# Patient Record
Sex: MALE | Race: WHITE | NOT HISPANIC OR LATINO | URBAN - METROPOLITAN AREA
[De-identification: names, ages, dates, MRNs, and addresses within clinical notes are randomized per-mention and may not be internally consistent; named-entity substitution may affect disease eponyms.]

---

## 2023-12-13 ENCOUNTER — APPOINTMENT (OUTPATIENT)
Dept: RADIOLOGY | Facility: HOSPITAL | Age: 43
DRG: 917 | End: 2023-12-13
Payer: COMMERCIAL

## 2023-12-13 ENCOUNTER — HOSPITAL ENCOUNTER (INPATIENT)
Facility: HOSPITAL | Age: 43
LOS: 3 days | Discharge: LEFT BEFORE DISCHARGE | DRG: 917 | End: 2023-12-17
Attending: EMERGENCY MEDICINE | Admitting: INTERNAL MEDICINE
Payer: COMMERCIAL

## 2023-12-13 DIAGNOSIS — J81.0 ACUTE PULMONARY EDEMA (MULTI): Primary | ICD-10-CM

## 2023-12-13 DIAGNOSIS — F19.10 SUBSTANCE ABUSE (MULTI): ICD-10-CM

## 2023-12-13 DIAGNOSIS — J96.01 ACUTE RESPIRATORY FAILURE WITH HYPOXIA (MULTI): ICD-10-CM

## 2023-12-13 LAB
ALBUMIN SERPL-MCNC: 4.2 G/DL (ref 3.5–5)
ALP BLD-CCNC: 137 U/L (ref 35–125)
ALT SERPL-CCNC: 23 U/L (ref 5–40)
AMPHETAMINES UR QL SCN>1000 NG/ML: NEGATIVE
ANION GAP SERPL CALC-SCNC: 14 MMOL/L
APPARATUS: ABNORMAL
APPEARANCE UR: CLEAR
ARTERIAL PATENCY WRIST A: POSITIVE
AST SERPL-CCNC: 41 U/L (ref 5–40)
BARBITURATES UR QL SCN>300 NG/ML: NEGATIVE
BASE EXCESS BLDA CALC-SCNC: 5.5 MMOL/L (ref -2–3)
BENZODIAZ UR QL SCN>300 NG/ML: POSITIVE
BILIRUB SERPL-MCNC: 0.3 MG/DL (ref 0.1–1.2)
BILIRUB UR STRIP.AUTO-MCNC: NEGATIVE MG/DL
BODY TEMPERATURE: 37 DEGREES CELSIUS
BUN SERPL-MCNC: 18 MG/DL (ref 8–25)
BZE UR QL SCN>300 NG/ML: NEGATIVE
CALCIUM SERPL-MCNC: 9.8 MG/DL (ref 8.5–10.4)
CANNABINOIDS UR QL SCN>50 NG/ML: NEGATIVE
CAOX CRY #/AREA UR COMP ASSIST: ABNORMAL /HPF
CHLORIDE SERPL-SCNC: 98 MMOL/L (ref 97–107)
CO2 SERPL-SCNC: 26 MMOL/L (ref 24–31)
COLOR UR: YELLOW
CREAT SERPL-MCNC: 0.7 MG/DL (ref 0.4–1.6)
ERYTHROCYTE [DISTWIDTH] IN BLOOD BY AUTOMATED COUNT: 12.5 % (ref 11.5–14.5)
FENTANYL+NORFENTANYL UR QL SCN: POSITIVE
GFR SERPL CREATININE-BSD FRML MDRD: >90 ML/MIN/1.73M*2
GLUCOSE SERPL-MCNC: 131 MG/DL (ref 65–99)
GLUCOSE UR STRIP.AUTO-MCNC: NORMAL MG/DL
HCO3 BLDA-SCNC: 31.1 MMOL/L (ref 22–26)
HCT VFR BLD AUTO: 39.3 % (ref 41–52)
HGB BLD-MCNC: 12.3 G/DL (ref 13.5–17.5)
INHALED O2 CONCENTRATION: 100 %
KETONES UR STRIP.AUTO-MCNC: NEGATIVE MG/DL
LACTATE BLDV-SCNC: 1.1 MMOL/L (ref 0.4–2)
LEUKOCYTE ESTERASE UR QL STRIP.AUTO: NEGATIVE
MCH RBC QN AUTO: 28.9 PG (ref 26–34)
MCHC RBC AUTO-ENTMCNC: 31.3 G/DL (ref 32–36)
MCV RBC AUTO: 93 FL (ref 80–100)
METHADONE UR QL SCN>300 NG/ML: POSITIVE
MUCOUS THREADS #/AREA URNS AUTO: ABNORMAL /LPF
NITRITE UR QL STRIP.AUTO: NEGATIVE
NRBC BLD-RTO: 0 /100 WBCS (ref 0–0)
NT-PROBNP SERPL-MCNC: 239 PG/ML (ref 0–93)
OPIATES UR QL SCN>300 NG/ML: POSITIVE
OXYCODONE UR QL: NEGATIVE
OXYHGB MFR BLDA: 96.9 % (ref 94–98)
PCO2 BLDA: 48 MM HG (ref 38–42)
PCP UR QL SCN>25 NG/ML: NEGATIVE
PH BLDA: 7.42 PH (ref 7.38–7.42)
PH UR STRIP.AUTO: 5.5 [PH]
PLATELET # BLD AUTO: 298 X10*3/UL (ref 150–450)
PO2 BLDA: 155 MM HG (ref 85–95)
POTASSIUM SERPL-SCNC: 3.2 MMOL/L (ref 3.4–5.1)
PROT SERPL-MCNC: 7.7 G/DL (ref 5.9–7.9)
PROT UR STRIP.AUTO-MCNC: ABNORMAL MG/DL
RBC # BLD AUTO: 4.25 X10*6/UL (ref 4.5–5.9)
RBC # UR STRIP.AUTO: ABNORMAL /UL
RBC #/AREA URNS AUTO: >20 /HPF
SAO2 % BLDA: 99 % (ref 94–100)
SODIUM SERPL-SCNC: 138 MMOL/L (ref 133–145)
SP GR UR STRIP.AUTO: 1.03
SPECIMEN DRAWN FROM PATIENT: ABNORMAL
TROPONIN T SERPL-MCNC: 7 NG/L
TROPONIN T SERPL-MCNC: 7 NG/L
UROBILINOGEN UR STRIP.AUTO-MCNC: ABNORMAL MG/DL
WBC # BLD AUTO: 15 X10*3/UL (ref 4.4–11.3)
WBC #/AREA URNS AUTO: ABNORMAL /HPF

## 2023-12-13 PROCEDURE — 85027 COMPLETE CBC AUTOMATED: CPT | Performed by: NURSE PRACTITIONER

## 2023-12-13 PROCEDURE — 2500000005 HC RX 250 GENERAL PHARMACY W/O HCPCS: Performed by: EMERGENCY MEDICINE

## 2023-12-13 PROCEDURE — 87040 BLOOD CULTURE FOR BACTERIA: CPT | Mod: WESLAB | Performed by: NURSE PRACTITIONER

## 2023-12-13 PROCEDURE — 80307 DRUG TEST PRSMV CHEM ANLYZR: CPT | Performed by: NURSE PRACTITIONER

## 2023-12-13 PROCEDURE — 99285 EMERGENCY DEPT VISIT HI MDM: CPT | Performed by: EMERGENCY MEDICINE

## 2023-12-13 PROCEDURE — 36415 COLL VENOUS BLD VENIPUNCTURE: CPT | Performed by: NURSE PRACTITIONER

## 2023-12-13 PROCEDURE — 70450 CT HEAD/BRAIN W/O DYE: CPT

## 2023-12-13 PROCEDURE — 71045 X-RAY EXAM CHEST 1 VIEW: CPT | Mod: FY

## 2023-12-13 PROCEDURE — 94760 N-INVAS EAR/PLS OXIMETRY 1: CPT

## 2023-12-13 PROCEDURE — 36600 WITHDRAWAL OF ARTERIAL BLOOD: CPT

## 2023-12-13 PROCEDURE — 5A0935A ASSISTANCE WITH RESPIRATORY VENTILATION, LESS THAN 24 CONSECUTIVE HOURS, HIGH NASAL FLOW/VELOCITY: ICD-10-PCS | Performed by: NURSE PRACTITIONER

## 2023-12-13 PROCEDURE — 81001 URINALYSIS AUTO W/SCOPE: CPT | Performed by: NURSE PRACTITIONER

## 2023-12-13 PROCEDURE — 84075 ASSAY ALKALINE PHOSPHATASE: CPT | Performed by: NURSE PRACTITIONER

## 2023-12-13 PROCEDURE — 94660 CPAP INITIATION&MGMT: CPT

## 2023-12-13 PROCEDURE — 93010 ELECTROCARDIOGRAM REPORT: CPT | Performed by: INTERNAL MEDICINE

## 2023-12-13 PROCEDURE — 82805 BLOOD GASES W/O2 SATURATION: CPT | Performed by: EMERGENCY MEDICINE

## 2023-12-13 PROCEDURE — 84484 ASSAY OF TROPONIN QUANT: CPT | Performed by: NURSE PRACTITIONER

## 2023-12-13 PROCEDURE — 83605 ASSAY OF LACTIC ACID: CPT | Performed by: NURSE PRACTITIONER

## 2023-12-13 PROCEDURE — 2500000004 HC RX 250 GENERAL PHARMACY W/ HCPCS (ALT 636 FOR OP/ED): Performed by: NURSE PRACTITIONER

## 2023-12-13 PROCEDURE — 83880 ASSAY OF NATRIURETIC PEPTIDE: CPT | Performed by: NURSE PRACTITIONER

## 2023-12-13 RX ORDER — ONDANSETRON HYDROCHLORIDE 2 MG/ML
4 INJECTION, SOLUTION INTRAVENOUS ONCE
Status: COMPLETED | OUTPATIENT
Start: 2023-12-13 | End: 2023-12-13

## 2023-12-13 RX ORDER — NALOXONE HYDROCHLORIDE 1 MG/ML
2 INJECTION INTRAMUSCULAR; INTRAVENOUS; SUBCUTANEOUS ONCE
Status: COMPLETED | OUTPATIENT
Start: 2023-12-13 | End: 2023-12-13

## 2023-12-13 RX ADMIN — NALOXONE HYDROCHLORIDE 2 MG: 1 INJECTION PARENTERAL at 20:11

## 2023-12-13 RX ADMIN — Medication: at 20:54

## 2023-12-13 RX ADMIN — ONDANSETRON 4 MG: 2 INJECTION INTRAMUSCULAR; INTRAVENOUS at 20:09

## 2023-12-13 RX ADMIN — Medication: at 23:33

## 2023-12-13 RX ADMIN — PIPERACILLIN SODIUM AND TAZOBACTAM SODIUM 4.5 G: 4; .5 INJECTION, SOLUTION INTRAVENOUS at 20:05

## 2023-12-13 ASSESSMENT — PAIN - FUNCTIONAL ASSESSMENT: PAIN_FUNCTIONAL_ASSESSMENT: UNABLE TO SELF-REPORT

## 2023-12-14 ENCOUNTER — APPOINTMENT (OUTPATIENT)
Dept: RADIOLOGY | Facility: HOSPITAL | Age: 43
DRG: 917 | End: 2023-12-14
Payer: COMMERCIAL

## 2023-12-14 PROBLEM — J81.0 ACUTE PULMONARY EDEMA (MULTI): Status: ACTIVE | Noted: 2023-12-14

## 2023-12-14 LAB
ALBUMIN SERPL-MCNC: 3.7 G/DL (ref 3.5–5)
ANION GAP SERPL CALC-SCNC: 10 MMOL/L
BASOPHILS # BLD AUTO: 0.03 X10*3/UL (ref 0–0.1)
BASOPHILS NFR BLD AUTO: 0.3 %
BUN SERPL-MCNC: 14 MG/DL (ref 8–25)
CALCIUM SERPL-MCNC: 9 MG/DL (ref 8.5–10.4)
CHLORIDE SERPL-SCNC: 99 MMOL/L (ref 97–107)
CO2 SERPL-SCNC: 30 MMOL/L (ref 24–31)
CREAT SERPL-MCNC: 0.7 MG/DL (ref 0.4–1.6)
EOSINOPHIL # BLD AUTO: 0.1 X10*3/UL (ref 0–0.7)
EOSINOPHIL NFR BLD AUTO: 0.8 %
ERYTHROCYTE [DISTWIDTH] IN BLOOD BY AUTOMATED COUNT: 12.3 % (ref 11.5–14.5)
GFR SERPL CREATININE-BSD FRML MDRD: >90 ML/MIN/1.73M*2
GLUCOSE SERPL-MCNC: 109 MG/DL (ref 65–99)
HCT VFR BLD AUTO: 37 % (ref 41–52)
HGB BLD-MCNC: 11.7 G/DL (ref 13.5–17.5)
IMM GRANULOCYTES # BLD AUTO: 0.04 X10*3/UL (ref 0–0.7)
IMM GRANULOCYTES NFR BLD AUTO: 0.3 % (ref 0–0.9)
LYMPHOCYTES # BLD AUTO: 2.2 X10*3/UL (ref 1.2–4.8)
LYMPHOCYTES NFR BLD AUTO: 18.5 %
MAGNESIUM SERPL-MCNC: 1.8 MG/DL (ref 1.6–3.1)
MCH RBC QN AUTO: 29.3 PG (ref 26–34)
MCHC RBC AUTO-ENTMCNC: 31.6 G/DL (ref 32–36)
MCV RBC AUTO: 93 FL (ref 80–100)
MONOCYTES # BLD AUTO: 0.61 X10*3/UL (ref 0.1–1)
MONOCYTES NFR BLD AUTO: 5.1 %
NEUTROPHILS # BLD AUTO: 8.88 X10*3/UL (ref 1.2–7.7)
NEUTROPHILS NFR BLD AUTO: 75 %
NRBC BLD-RTO: 0 /100 WBCS (ref 0–0)
PHOSPHATE SERPL-MCNC: 4 MG/DL (ref 2.5–4.5)
PLATELET # BLD AUTO: 273 X10*3/UL (ref 150–450)
POTASSIUM SERPL-SCNC: 3.7 MMOL/L (ref 3.4–5.1)
RBC # BLD AUTO: 4 X10*6/UL (ref 4.5–5.9)
SARS-COV-2 RNA RESP QL NAA+PROBE: NOT DETECTED
SODIUM SERPL-SCNC: 139 MMOL/L (ref 133–145)
TROPONIN T SERPL-MCNC: 8 NG/L
TROPONIN T SERPL-MCNC: 8 NG/L
WBC # BLD AUTO: 11.9 X10*3/UL (ref 4.4–11.3)

## 2023-12-14 PROCEDURE — 2500000001 HC RX 250 WO HCPCS SELF ADMINISTERED DRUGS (ALT 637 FOR MEDICARE OP): Performed by: INTERNAL MEDICINE

## 2023-12-14 PROCEDURE — 2020000001 HC ICU ROOM DAILY

## 2023-12-14 PROCEDURE — 85025 COMPLETE CBC W/AUTO DIFF WBC: CPT | Performed by: INTERNAL MEDICINE

## 2023-12-14 PROCEDURE — C9113 INJ PANTOPRAZOLE SODIUM, VIA: HCPCS | Performed by: INTERNAL MEDICINE

## 2023-12-14 PROCEDURE — 83735 ASSAY OF MAGNESIUM: CPT | Performed by: INTERNAL MEDICINE

## 2023-12-14 PROCEDURE — 2500000005 HC RX 250 GENERAL PHARMACY W/O HCPCS: Performed by: EMERGENCY MEDICINE

## 2023-12-14 PROCEDURE — 94760 N-INVAS EAR/PLS OXIMETRY 1: CPT

## 2023-12-14 PROCEDURE — 87635 SARS-COV-2 COVID-19 AMP PRB: CPT | Performed by: EMERGENCY MEDICINE

## 2023-12-14 PROCEDURE — 96372 THER/PROPH/DIAG INJ SC/IM: CPT | Performed by: INTERNAL MEDICINE

## 2023-12-14 PROCEDURE — 2500000002 HC RX 250 W HCPCS SELF ADMINISTERED DRUGS (ALT 637 FOR MEDICARE OP, ALT 636 FOR OP/ED): Performed by: INTERNAL MEDICINE

## 2023-12-14 PROCEDURE — 2500000004 HC RX 250 GENERAL PHARMACY W/ HCPCS (ALT 636 FOR OP/ED): Performed by: INTERNAL MEDICINE

## 2023-12-14 PROCEDURE — 71045 X-RAY EXAM CHEST 1 VIEW: CPT

## 2023-12-14 PROCEDURE — 99223 1ST HOSP IP/OBS HIGH 75: CPT

## 2023-12-14 PROCEDURE — 2500000001 HC RX 250 WO HCPCS SELF ADMINISTERED DRUGS (ALT 637 FOR MEDICARE OP): Performed by: HOSPITALIST

## 2023-12-14 PROCEDURE — 84484 ASSAY OF TROPONIN QUANT: CPT | Performed by: INTERNAL MEDICINE

## 2023-12-14 PROCEDURE — 36415 COLL VENOUS BLD VENIPUNCTURE: CPT | Performed by: NURSE PRACTITIONER

## 2023-12-14 PROCEDURE — 2500000004 HC RX 250 GENERAL PHARMACY W/ HCPCS (ALT 636 FOR OP/ED)

## 2023-12-14 PROCEDURE — 84484 ASSAY OF TROPONIN QUANT: CPT | Performed by: NURSE PRACTITIONER

## 2023-12-14 PROCEDURE — 80069 RENAL FUNCTION PANEL: CPT | Performed by: INTERNAL MEDICINE

## 2023-12-14 PROCEDURE — 94660 CPAP INITIATION&MGMT: CPT

## 2023-12-14 PROCEDURE — 36415 COLL VENOUS BLD VENIPUNCTURE: CPT | Performed by: INTERNAL MEDICINE

## 2023-12-14 PROCEDURE — 2500000005 HC RX 250 GENERAL PHARMACY W/O HCPCS: Performed by: INTERNAL MEDICINE

## 2023-12-14 RX ORDER — IBUPROFEN 600 MG/1
600 TABLET ORAL EVERY 6 HOURS PRN
Status: DISCONTINUED | OUTPATIENT
Start: 2023-12-14 | End: 2023-12-17 | Stop reason: HOSPADM

## 2023-12-14 RX ORDER — TRAZODONE HYDROCHLORIDE 50 MG/1
25 TABLET ORAL NIGHTLY PRN
Status: DISCONTINUED | OUTPATIENT
Start: 2023-12-14 | End: 2023-12-17 | Stop reason: HOSPADM

## 2023-12-14 RX ORDER — ACETAMINOPHEN 325 MG/1
650 TABLET ORAL EVERY 4 HOURS PRN
Status: DISCONTINUED | OUTPATIENT
Start: 2023-12-14 | End: 2023-12-17 | Stop reason: HOSPADM

## 2023-12-14 RX ORDER — DULOXETIN HYDROCHLORIDE 20 MG/1
20 CAPSULE, DELAYED RELEASE ORAL DAILY
COMMUNITY

## 2023-12-14 RX ORDER — ONDANSETRON HYDROCHLORIDE 2 MG/ML
4 INJECTION, SOLUTION INTRAVENOUS EVERY 8 HOURS PRN
Status: DISCONTINUED | OUTPATIENT
Start: 2023-12-14 | End: 2023-12-17 | Stop reason: HOSPADM

## 2023-12-14 RX ORDER — ACETAMINOPHEN 160 MG/5ML
650 SOLUTION ORAL EVERY 4 HOURS PRN
Status: DISCONTINUED | OUTPATIENT
Start: 2023-12-14 | End: 2023-12-17 | Stop reason: HOSPADM

## 2023-12-14 RX ORDER — ONDANSETRON 4 MG/1
4 TABLET, FILM COATED ORAL EVERY 8 HOURS PRN
Status: DISCONTINUED | OUTPATIENT
Start: 2023-12-14 | End: 2023-12-17 | Stop reason: HOSPADM

## 2023-12-14 RX ORDER — IBUPROFEN 200 MG
1 TABLET ORAL EVERY 24 HOURS
COMMUNITY

## 2023-12-14 RX ORDER — BUPRENORPHINE 2 MG/1
2 TABLET SUBLINGUAL EVERY 4 HOURS PRN
Status: DISCONTINUED | OUTPATIENT
Start: 2023-12-14 | End: 2023-12-17 | Stop reason: HOSPADM

## 2023-12-14 RX ORDER — ZOLPIDEM TARTRATE 12.5 MG/1
12.5 TABLET, FILM COATED, EXTENDED RELEASE ORAL NIGHTLY PRN
COMMUNITY

## 2023-12-14 RX ORDER — KETOROLAC TROMETHAMINE 30 MG/ML
30 INJECTION, SOLUTION INTRAMUSCULAR; INTRAVENOUS EVERY 6 HOURS PRN
Status: DISCONTINUED | OUTPATIENT
Start: 2023-12-14 | End: 2023-12-14 | Stop reason: ALTCHOICE

## 2023-12-14 RX ORDER — LIDOCAINE 560 MG/1
1 PATCH PERCUTANEOUS; TOPICAL; TRANSDERMAL DAILY
Status: DISCONTINUED | OUTPATIENT
Start: 2023-12-14 | End: 2023-12-17 | Stop reason: HOSPADM

## 2023-12-14 RX ORDER — DOXYCYCLINE 100 MG/1
100 TABLET ORAL 2 TIMES DAILY
COMMUNITY

## 2023-12-14 RX ORDER — PANTOPRAZOLE SODIUM 40 MG/1
40 TABLET, DELAYED RELEASE ORAL 2 TIMES DAILY
COMMUNITY

## 2023-12-14 RX ORDER — POTASSIUM CHLORIDE 1.5 G/1.58G
40 POWDER, FOR SOLUTION ORAL ONCE
Status: COMPLETED | OUTPATIENT
Start: 2023-12-14 | End: 2023-12-14

## 2023-12-14 RX ORDER — CLONIDINE HYDROCHLORIDE 0.1 MG/1
0.1 TABLET ORAL EVERY 6 HOURS PRN
Status: DISCONTINUED | OUTPATIENT
Start: 2023-12-14 | End: 2023-12-17 | Stop reason: HOSPADM

## 2023-12-14 RX ORDER — FUROSEMIDE 10 MG/ML
40 INJECTION INTRAMUSCULAR; INTRAVENOUS ONCE
Status: COMPLETED | OUTPATIENT
Start: 2023-12-14 | End: 2023-12-14

## 2023-12-14 RX ORDER — MEROPENEM 1 G/1
1 INJECTION, POWDER, FOR SOLUTION INTRAVENOUS EVERY 8 HOURS
Status: DISCONTINUED | OUTPATIENT
Start: 2023-12-14 | End: 2023-12-17 | Stop reason: HOSPADM

## 2023-12-14 RX ORDER — PANTOPRAZOLE SODIUM 40 MG/10ML
40 INJECTION, POWDER, LYOPHILIZED, FOR SOLUTION INTRAVENOUS DAILY
Status: DISCONTINUED | OUTPATIENT
Start: 2023-12-14 | End: 2023-12-15

## 2023-12-14 RX ORDER — SILDENAFIL 50 MG/1
50 TABLET, FILM COATED ORAL DAILY PRN
COMMUNITY

## 2023-12-14 RX ORDER — METHOCARBAMOL 500 MG/1
500 TABLET, FILM COATED ORAL EVERY 6 HOURS PRN
Status: DISCONTINUED | OUTPATIENT
Start: 2023-12-14 | End: 2023-12-17 | Stop reason: HOSPADM

## 2023-12-14 RX ORDER — LORAZEPAM 1 MG/1
1 TABLET ORAL EVERY 4 HOURS PRN
Status: DISCONTINUED | OUTPATIENT
Start: 2023-12-14 | End: 2023-12-17 | Stop reason: HOSPADM

## 2023-12-14 RX ORDER — ENOXAPARIN SODIUM 100 MG/ML
40 INJECTION SUBCUTANEOUS DAILY
Status: DISCONTINUED | OUTPATIENT
Start: 2023-12-14 | End: 2023-12-17 | Stop reason: HOSPADM

## 2023-12-14 RX ORDER — TRAMADOL HYDROCHLORIDE 50 MG/1
50 TABLET ORAL 4 TIMES DAILY
COMMUNITY

## 2023-12-14 RX ORDER — LANOLIN ALCOHOL/MO/W.PET/CERES
100 CREAM (GRAM) TOPICAL DAILY
Status: DISCONTINUED | OUTPATIENT
Start: 2023-12-14 | End: 2023-12-17 | Stop reason: HOSPADM

## 2023-12-14 RX ORDER — GABAPENTIN 400 MG/1
800 CAPSULE ORAL EVERY 8 HOURS SCHEDULED
Status: DISCONTINUED | OUTPATIENT
Start: 2023-12-14 | End: 2023-12-15

## 2023-12-14 RX ORDER — GABAPENTIN 800 MG/1
800 TABLET ORAL 3 TIMES DAILY
COMMUNITY

## 2023-12-14 RX ORDER — ACETAMINOPHEN 650 MG/1
650 SUPPOSITORY RECTAL EVERY 4 HOURS PRN
Status: DISCONTINUED | OUTPATIENT
Start: 2023-12-14 | End: 2023-12-17 | Stop reason: HOSPADM

## 2023-12-14 RX ORDER — POLYETHYLENE GLYCOL 3350 17 G/17G
17 POWDER, FOR SOLUTION ORAL DAILY PRN
Status: DISCONTINUED | OUTPATIENT
Start: 2023-12-14 | End: 2023-12-17 | Stop reason: HOSPADM

## 2023-12-14 RX ORDER — DICYCLOMINE HYDROCHLORIDE 10 MG/1
10 CAPSULE ORAL EVERY 6 HOURS PRN
Status: DISCONTINUED | OUTPATIENT
Start: 2023-12-14 | End: 2023-12-17 | Stop reason: HOSPADM

## 2023-12-14 RX ORDER — HYDROXYZINE PAMOATE 50 MG/1
50 CAPSULE ORAL EVERY 6 HOURS PRN
Status: DISCONTINUED | OUTPATIENT
Start: 2023-12-14 | End: 2023-12-17 | Stop reason: HOSPADM

## 2023-12-14 RX ADMIN — Medication 1 G: at 03:45

## 2023-12-14 RX ADMIN — BUSPIRONE HYDROCHLORIDE 15 MG: 10 TABLET ORAL at 20:12

## 2023-12-14 RX ADMIN — POTASSIUM CHLORIDE 40 MEQ: 1.5 FOR SOLUTION ORAL at 05:37

## 2023-12-14 RX ADMIN — BUPRENORPHINE 2 MG: 2 TABLET SUBLINGUAL at 23:21

## 2023-12-14 RX ADMIN — FUROSEMIDE 40 MG: 10 INJECTION, SOLUTION INTRAMUSCULAR; INTRAVENOUS at 03:45

## 2023-12-14 RX ADMIN — KETOROLAC TROMETHAMINE 30 MG: 30 INJECTION, SOLUTION INTRAMUSCULAR at 05:42

## 2023-12-14 RX ADMIN — LIDOCAINE 1 PATCH: 4 PATCH TOPICAL at 03:45

## 2023-12-14 RX ADMIN — HYDROXYZINE PAMOATE 50 MG: 50 CAPSULE ORAL at 23:12

## 2023-12-14 RX ADMIN — LORAZEPAM 1 MG: 1 TABLET ORAL at 13:27

## 2023-12-14 RX ADMIN — METHYLPREDNISOLONE SODIUM SUCCINATE 40 MG: 40 INJECTION, POWDER, FOR SOLUTION INTRAMUSCULAR; INTRAVENOUS at 03:44

## 2023-12-14 RX ADMIN — BUPRENORPHINE 2 MG: 2 TABLET SUBLINGUAL at 13:50

## 2023-12-14 RX ADMIN — Medication 1 G: at 19:06

## 2023-12-14 RX ADMIN — GABAPENTIN 800 MG: 400 CAPSULE ORAL at 21:15

## 2023-12-14 RX ADMIN — Medication 1 G: at 11:02

## 2023-12-14 RX ADMIN — PANTOPRAZOLE SODIUM 40 MG: 40 INJECTION, POWDER, FOR SOLUTION INTRAVENOUS at 08:57

## 2023-12-14 RX ADMIN — Medication: at 03:58

## 2023-12-14 RX ADMIN — Medication 40 L/MIN: at 07:13

## 2023-12-14 SDOH — HEALTH STABILITY: PHYSICAL HEALTH
ON AVERAGE, HOW MANY DAYS PER WEEK DO YOU ENGAGE IN MODERATE TO STRENUOUS EXERCISE (LIKE A BRISK WALK)?: PATIENT DECLINED

## 2023-12-14 SDOH — SOCIAL STABILITY: SOCIAL INSECURITY: HAS ANYONE EVER THREATENED TO HURT YOUR FAMILY OR YOUR PETS?: NO

## 2023-12-14 SDOH — SOCIAL STABILITY: SOCIAL INSECURITY: HAVE YOU HAD THOUGHTS OF HARMING ANYONE ELSE?: NO

## 2023-12-14 SDOH — SOCIAL STABILITY: SOCIAL INSECURITY: DO YOU FEEL ANYONE HAS EXPLOITED OR TAKEN ADVANTAGE OF YOU FINANCIALLY OR OF YOUR PERSONAL PROPERTY?: NO

## 2023-12-14 SDOH — SOCIAL STABILITY: SOCIAL INSECURITY: ABUSE: ADULT

## 2023-12-14 SDOH — SOCIAL STABILITY: SOCIAL INSECURITY: ARE YOU OR HAVE YOU BEEN THREATENED OR ABUSED PHYSICALLY, EMOTIONALLY, OR SEXUALLY BY ANYONE?: NO

## 2023-12-14 SDOH — HEALTH STABILITY: PHYSICAL HEALTH: ON AVERAGE, HOW MANY MINUTES DO YOU ENGAGE IN EXERCISE AT THIS LEVEL?: PATIENT DECLINED

## 2023-12-14 SDOH — SOCIAL STABILITY: SOCIAL INSECURITY: ARE THERE ANY APPARENT SIGNS OF INJURIES/BEHAVIORS THAT COULD BE RELATED TO ABUSE/NEGLECT?: NO

## 2023-12-14 SDOH — SOCIAL STABILITY: SOCIAL INSECURITY: WERE YOU ABLE TO COMPLETE ALL THE BEHAVIORAL HEALTH SCREENINGS?: YES

## 2023-12-14 SDOH — SOCIAL STABILITY: SOCIAL INSECURITY: DO YOU FEEL UNSAFE GOING BACK TO THE PLACE WHERE YOU ARE LIVING?: NO

## 2023-12-14 SDOH — SOCIAL STABILITY: SOCIAL INSECURITY: DOES ANYONE TRY TO KEEP YOU FROM HAVING/CONTACTING OTHER FRIENDS OR DOING THINGS OUTSIDE YOUR HOME?: NO

## 2023-12-14 ASSESSMENT — ENCOUNTER SYMPTOMS
APPETITE CHANGE: 1
AGITATION: 0
DECREASED CONCENTRATION: 1
HYPERACTIVE: 1
HALLUCINATIONS: 1
NERVOUS/ANXIOUS: 1
FATIGUE: 1
UNEXPECTED WEIGHT CHANGE: 0
ACTIVITY CHANGE: 1
SHORTNESS OF BREATH: 1
SLEEP DISTURBANCE: 1
WEAKNESS: 1
DYSPHORIC MOOD: 1
CONFUSION: 1

## 2023-12-14 ASSESSMENT — COLUMBIA-SUICIDE SEVERITY RATING SCALE - C-SSRS
2. HAVE YOU ACTUALLY HAD ANY THOUGHTS OF KILLING YOURSELF?: NO
6. HAVE YOU EVER DONE ANYTHING, STARTED TO DO ANYTHING, OR PREPARED TO DO ANYTHING TO END YOUR LIFE?: NO
1. IN THE PAST MONTH, HAVE YOU WISHED YOU WERE DEAD OR WISHED YOU COULD GO TO SLEEP AND NOT WAKE UP?: NO

## 2023-12-14 ASSESSMENT — COGNITIVE AND FUNCTIONAL STATUS - GENERAL
MOVING TO AND FROM BED TO CHAIR: TOTAL
PATIENT BASELINE BEDBOUND: NO
MOVING FROM LYING ON BACK TO SITTING ON SIDE OF FLAT BED WITH BEDRAILS: TOTAL
MOBILITY SCORE: 6
TURNING FROM BACK TO SIDE WHILE IN FLAT BAD: TOTAL
EATING MEALS: TOTAL
HELP NEEDED FOR BATHING: TOTAL
CLIMB 3 TO 5 STEPS WITH RAILING: TOTAL
DRESSING REGULAR LOWER BODY CLOTHING: TOTAL
TOILETING: TOTAL
DAILY ACTIVITIY SCORE: 6
STANDING UP FROM CHAIR USING ARMS: TOTAL
PERSONAL GROOMING: TOTAL
WALKING IN HOSPITAL ROOM: TOTAL
DRESSING REGULAR UPPER BODY CLOTHING: TOTAL

## 2023-12-14 ASSESSMENT — PAIN SCALES - GENERAL
PAINLEVEL_OUTOF10: 9
PAINLEVEL_OUTOF10: 6
PAINLEVEL_OUTOF10: 6
PAINLEVEL_OUTOF10: 7
PAINLEVEL_OUTOF10: 0 - NO PAIN
PAINLEVEL_OUTOF10: 0 - NO PAIN

## 2023-12-14 ASSESSMENT — PAIN DESCRIPTION - LOCATION: LOCATION: BACK

## 2023-12-14 ASSESSMENT — PATIENT HEALTH QUESTIONNAIRE - PHQ9
1. LITTLE INTEREST OR PLEASURE IN DOING THINGS: NOT AT ALL
2. FEELING DOWN, DEPRESSED OR HOPELESS: NOT AT ALL
SUM OF ALL RESPONSES TO PHQ9 QUESTIONS 1 & 2: 0

## 2023-12-14 ASSESSMENT — ACTIVITIES OF DAILY LIVING (ADL)
HEARING - RIGHT EAR: FUNCTIONAL
DRESSING YOURSELF: INDEPENDENT
PATIENT'S MEMORY ADEQUATE TO SAFELY COMPLETE DAILY ACTIVITIES?: YES
BATHING: INDEPENDENT
TOILETING: INDEPENDENT
PATIENT'S MEMORY ADEQUATE TO SAFELY COMPLETE DAILY ACTIVITIES?: YES
ADEQUATE_TO_COMPLETE_ADL: YES
TOILETING: INDEPENDENT
WALKS IN HOME: INDEPENDENT
GROOMING: INDEPENDENT
HEARING - RIGHT EAR: FUNCTIONAL
BATHING: INDEPENDENT
FEEDING YOURSELF: INDEPENDENT
FEEDING YOURSELF: INDEPENDENT
LACK_OF_TRANSPORTATION: PATIENT DECLINED
JUDGMENT_ADEQUATE_SAFELY_COMPLETE_DAILY_ACTIVITIES: NO
ADEQUATE_TO_COMPLETE_ADL: YES
DRESSING YOURSELF: INDEPENDENT
JUDGMENT_ADEQUATE_SAFELY_COMPLETE_DAILY_ACTIVITIES: NO
WALKS IN HOME: INDEPENDENT
EFFECT OF PAIN ON DAILY ACTIVITIES: MOVEMENT
HEARING - LEFT EAR: FUNCTIONAL
GROOMING: INDEPENDENT
HEARING - LEFT EAR: FUNCTIONAL

## 2023-12-14 ASSESSMENT — PAIN DESCRIPTION - DESCRIPTORS
DESCRIPTORS: ACHING

## 2023-12-14 ASSESSMENT — PAIN - FUNCTIONAL ASSESSMENT
PAIN_FUNCTIONAL_ASSESSMENT: 0-10
PAIN_FUNCTIONAL_ASSESSMENT: FLACC (FACE, LEGS, ACTIVITY, CRY, CONSOLABILITY)
PAIN_FUNCTIONAL_ASSESSMENT: 0-10

## 2023-12-14 ASSESSMENT — LIFESTYLE VARIABLES
HOW OFTEN DO YOU HAVE A DRINK CONTAINING ALCOHOL: NEVER
TOTAL_SCORE: 10
AUDIT-C TOTAL SCORE: 0
AUDIT-C TOTAL SCORE: 0
SKIP TO QUESTIONS 9-10: 1
SUBSTANCE_ABUSE_PAST_12_MONTHS: YES
HOW OFTEN DO YOU HAVE 6 OR MORE DRINKS ON ONE OCCASION: NEVER
TOTAL_SCORE: 14
HOW MANY STANDARD DRINKS CONTAINING ALCOHOL DO YOU HAVE ON A TYPICAL DAY: PATIENT DOES NOT DRINK
PRESCIPTION_ABUSE_PAST_12_MONTHS: NO

## 2023-12-14 ASSESSMENT — PAIN DESCRIPTION - ORIENTATION: ORIENTATION: MID

## 2023-12-14 NOTE — CONSULTS
"Inpatient consult to Psychiatry  Consult performed by: DONALD Hunter-BAHMAN  Consult ordered by: Ginger Turner MD  Reason for consult: Multisubstance abuse, Competent to leave AMA?          History Of Present Illness  Abad Moore Jr. is a 43 y.o. male presenting with Acute pulmonary edema.    Before seeing the patient face-to-face, we discussed the case together with the nurse taking care of the patient. The nurse reports that she had to educate the patient multiple times and explain the situation as he was requesting to leave AMA. Additionally, the patient had been refusing medications despite the RN's education on the importance of receiving treatment. The nurse reports that the patient is agreeable to allowing her to start antibiotic IV.   The patient was lying in bed with a nasal cannula, receiving oxygen in a little bit of distress. When asked how he's doing, the patient states that he's \"doing okay\". We asked the patient if he was okay to sit and asked him a few questions, to which he responded, \"I don't care.\" The patient seems to be alert and oriented but has a little bit of difficulty answering questions, needing time to think about the date and location. He states that he is unable to recall any of the events that brought him here. We discussed with the patient that he has been constantly telling the nurse that he wants to leave AMA, and we explained to him the reasons for his admission and the consequences of leaving against medical advice. However, the patient states that he doesn't really care for the consequences stating that he can't take the annoyance of having to wear the nasal cannula to receive O2. He took the Nasal cannula off , but followed the directions of this provider and placed it back on. We continued to ask the patient if he still wants to leave after all that we had discussed, to which he states that he is still undecided \"I am still on the fence\". He mentions feeling " irritable due to being in the hospital and annoyed at having to wear a nasal cannula, and man catheter as he feels it limits his independence.   The patient is showing cognitive impairment and lack of ability ta make a decision and does not fully grasp what has occurred. He has difficulty maintaining proper eye contact and easily becomes irritable with the questions. However, he complies and attempts to answer most of them superficially with very short answers. He often states that he doesn't want to say more than what he's already said. The patient denies feeling depressed or having mood swings or feeling euphoric. He mentions feeling irritable not because of people but because he's in the hospital. The patient denies having any racing thoughts of any kind. He states that he's anxious because he doesn't understand what happened and what is next. The patient states that he was unable to sleep last night, barely sleeping at all. He shares that he usually sleeps for a minimum of 5 to 6 hours at home and has difficulty falling asleep at times, experiencing frequent nightmares. When asked if he takes any medications or has seen a mental health specialist, he states that he has not. The patient states that he has no appetite today but denies any weight loss or gain. He mentions that his energy levels are very low, below baseline. He has no issues with concentration and denies feeling impulsive. The patient states that he has decreased motivation to do things, especially now.   He denies any auditory or visual hallucinations. The patient denies any suicidal or homicidal ideation and any past suicide attempts. The patient states that the pain is severe, rating it at 10 out of 10 and generalized throughout his body. He denies having an outside or inpatient provider. The patient states that he lives at home with his wife. He states that he smokes less than half a pack of cigarettes a day but denies using alcohol,  "mentioning that it has been years since his last drink. He admits to using heroin daily, spending $20 or more, but denies using any other substances. He mentions that fentanyl, methadone, and benzodiazepine may have been mixed with the heroin he had snorted. The patient denies any past trauma or history of abuse. He states that he felt safe at home and denies having any guns or weapons. Capacity evaluation was conducted, and it was determined that the patient lacks capacity at this time. We will continue to follow up with the patient.        Past Medical History  He has a past medical history of GERD (gastroesophageal reflux disease) and Peptic ulcer.    Surgical History  He has a past surgical history that includes Other surgical history (04/19/2019) and Abdominal surgery.     Social History  The patient reports that he smokes less than 1/2 pack per day.He reports current drug use. Drug: Heroin. He reports that he does not drink alcohol.  Abuse/Trauma  none    Past Treatment   Inpatient: Denies    Outpatient: none      Family Psychiatric History  Family history is significant for anxiety and depression states in both parents.     Past Medications  None    Substance Abuse  Yes - Heroin / Morphine / Other opiates - Heroin. Amount: 20$ or more. Frequency/Route: Daily/ snorting . Last Used: 2 days.  The patient states that the other substances may have been mixed with his heroin.        Access to Fire Arms and/or Weapons: Denies    Legal Issues: two other prior arrests, the patient states\"for drugs\".    Allergies  Sulfa (sulfonamide antibiotics)    Review of Systems   Constitutional:  Positive for activity change, appetite change and fatigue. Negative for unexpected weight change.   Respiratory:  Positive for shortness of breath.    Neurological:  Positive for weakness.   Psychiatric/Behavioral:  Positive for behavioral problems, confusion, decreased concentration, dysphoric mood, hallucinations and sleep disturbance. " Negative for agitation, self-injury and suicidal ideas. The patient is nervous/anxious and is hyperactive.          Mental Status Exam  General: alert, drowsy, and withdrawn   Appearance: Disheveled. and Averted gaze.  Attitude/Behavior:Difficult to engage., Guarded., Superficially cooperative., Suspicious. , Distracted., and Poor eye contact.  Motor Activity: No psychomotor agitation or retardation, no tremor or other abnormal movements.  Speech: minimal conversation  Mood: anxious, irritable, and sad  Affect: flat  Thought Process: circumstantial  Thought Content: Mood incongruent  Thought Perception: No perceptual abnormalities noted  Cognition: Attention: Mild impairment in attention  Insight: limited  Judgement: Limited Tenuous    Psychiatric Risk Assessment  Violence Risk Assessment: male and substance abuse  Acute Risk of Harm to Others is Considered: moderate   Suicide Risk Assessment: , life crisis (shame/despair), male, and substance abuse  Protective Factors against Suicide: marriage/partnership, positive family relationships, and sense of responsibility toward family  Acute Risk of Harm to Self is Considered: low    Current Medications    Scheduled medications  enoxaparin, 40 mg, subcutaneous, Daily  lidocaine, 1 patch, transdermal, Daily  meropenem, 1 g, intravenous, q8h  pantoprazole, 40 mg, intravenous, Daily  thiamine, 100 mg, oral, Daily      Continuous medications     PRN medications  PRN medications: acetaminophen **OR** acetaminophen **OR** acetaminophen, buprenorphine, cloNIDine, dicyclomine, ibuprofen, LORazepam, methocarbamol, ondansetron **OR** ondansetron, oxygen, polyethylene glycol         Relevant Results        @[email or web address] Cent[facility]@    Relevant Results  Results for orders placed or performed during the hospital encounter of 12/13/23 (from the past 24 hour(s))   CBC   Result Value Ref Range    WBC 15.0 (H) 4.4 - 11.3 x10*3/uL    nRBC 0.0 0.0 - 0.0 /100 WBCs    RBC 4.25 (L) 4.50 - 5.90  x10*6/uL    Hemoglobin 12.3 (L) 13.5 - 17.5 g/dL    Hematocrit 39.3 (L) 41.0 - 52.0 %    MCV 93 80 - 100 fL    MCH 28.9 26.0 - 34.0 pg    MCHC 31.3 (L) 32.0 - 36.0 g/dL    RDW 12.5 11.5 - 14.5 %    Platelets 298 150 - 450 x10*3/uL   Comprehensive Metabolic Panel   Result Value Ref Range    Glucose 131 (H) 65 - 99 mg/dL    Sodium 138 133 - 145 mmol/L    Potassium 3.2 (L) 3.4 - 5.1 mmol/L    Chloride 98 97 - 107 mmol/L    Bicarbonate 26 24 - 31 mmol/L    Urea Nitrogen 18 8 - 25 mg/dL    Creatinine 0.70 0.40 - 1.60 mg/dL    eGFR >90 >60 mL/min/1.73m*2    Calcium 9.8 8.5 - 10.4 mg/dL    Albumin 4.2 3.5 - 5.0 g/dL    Alkaline Phosphatase 137 (H) 35 - 125 U/L    Total Protein 7.7 5.9 - 7.9 g/dL    AST 41 (H) 5 - 40 U/L    Bilirubin, Total 0.3 0.1 - 1.2 mg/dL    ALT 23 5 - 40 U/L    Anion Gap 14 <=19 mmol/L   NT-PROBNP   Result Value Ref Range    PROBNP 239 (H) 0 - 93 pg/mL   Blood Gas Lactic Acid, Venous   Result Value Ref Range    POCT Lactate, Venous 1.1 0.4 - 2.0 mmol/L   Serial Troponin, Initial ([facility])   Result Value Ref Range    Troponin T, High Sensitivity 7 <=15 ng/L   DRUG SCREEN,URINE   Result Value Ref Range    Amphetamine Screen, Urine Negative      Barbiturate Screen, Urine Negative      Benzodiazepines Screen, Urine Positive (A)      Cannabinoid Screen, Urine Negative      Cocaine Metabolite Screen, Urine Negative      Fentanyl Screen, Urine Positive (A)       Methadone Screen, Urine Positive (A)      Opiate Screen, Urine Positive (A)      Oxycodone Screen, Urine Negative      PCP Screen, Urine Negative     Urinalysis with Reflex Microscopic   Result Value Ref Range    Color, Urine Yellow Light-Yellow, Yellow, Dark-Yellow    Appearance, Urine Clear Clear    Specific Gravity, Urine 1.033 1.005 - 1.035    pH, Urine 5.5 5.0, 5.5, 6.0, 6.5, 7.0, 7.5, 8.0    Protein, Urine 50 (1+) (A) NEGATIVE, 10 (TRACE), 20 (TRACE) mg/dL    Glucose, Urine Normal Normal mg/dL    Blood, Urine 0.03 (TRACE) (A) NEGATIVE    Ketones,  Urine NEGATIVE NEGATIVE mg/dL    Bilirubin, Urine NEGATIVE NEGATIVE    Urobilinogen, Urine 3 (1+) (A) Normal mg/dL    Nitrite, Urine NEGATIVE NEGATIVE    Leukocyte Esterase, Urine NEGATIVE NEGATIVE   Microscopic Only, Urine   Result Value Ref Range    WBC, Urine 1-5 1-5, NONE /HPF    RBC, Urine >20 (A) NONE, 1-2, 3-5 /HPF    Mucus, Urine FEW Reference range not established. /LPF    Calcium Oxalate Crystals, Urine 1+ NONE, 1+ /HPF   BLOOD GAS ARTERIAL   Result Value Ref Range    POCT pH, Arterial 7.42 7.38 - 7.42 pH    POCT pCO2, Arterial 48 (H) 38 - 42 mm Hg    POCT pO2, Arterial 155 (H) 85 - 95 mm Hg    POCT SO2, Arterial 99 94 - 100 %    POCT Oxy Hemoglobin, Arterial 96.9 94.0 - 98.0 %    POCT Base Excess, Arterial 5.5 (H) -2.0 - 3.0 mmol/L    POCT HCO3 Calculated, Arterial 31.1 (H) 22.0 - 26.0 mmol/L    Patient Temperature 37.0 degrees Celsius    FiO2 100 %    Apparatus HIGH FLOW CANNULA     Site of Arterial Puncture Radial Right     Chong's Test Positive    Serial Troponin, 2 Hour ([facility])   Result Value Ref Range    Troponin T, High Sensitivity 7 <=15 ng/L   Sars-CoV-2 PCR, Screen Asymptomatic   Result Value Ref Range    Coronavirus 2019, PCR Not Detected Not Detected   Serial Troponin, 6 Hour ([facility])   Result Value Ref Range    Troponin T, High Sensitivity 8 <=15 ng/L   CBC and Auto Differential   Result Value Ref Range    WBC 11.9 (H) 4.4 - 11.3 x10*3/uL    nRBC 0.0 0.0 - 0.0 /100 WBCs    RBC 4.00 (L) 4.50 - 5.90 x10*6/uL    Hemoglobin 11.7 (L) 13.5 - 17.5 g/dL    Hematocrit 37.0 (L) 41.0 - 52.0 %    MCV 93 80 - 100 fL    MCH 29.3 26.0 - 34.0 pg    MCHC 31.6 (L) 32.0 - 36.0 g/dL    RDW 12.3 11.5 - 14.5 %    Platelets 273 150 - 450 x10*3/uL    Neutrophils % 75.0 40.0 - 80.0 %    Immature Granulocytes %, Automated 0.3 0.0 - 0.9 %    Lymphocytes % 18.5 13.0 - 44.0 %    Monocytes % 5.1 2.0 - 10.0 %    Eosinophils % 0.8 0.0 - 6.0 %    Basophils % 0.3 0.0 - 2.0 %    Neutrophils Absolute 8.88 (H) 1.20 - 7.70 x10*3/uL     Immature Granulocytes Absolute, Automated 0.04 0.00 - 0.70 x10*3/uL    Lymphocytes Absolute 2.20 1.20 - 4.80 x10*3/uL    Monocytes Absolute 0.61 0.10 - 1.00 x10*3/uL    Eosinophils Absolute 0.10 0.00 - 0.70 x10*3/uL    Basophils Absolute 0.03 0.00 - 0.10 x10*3/uL   Renal Function Panel   Result Value Ref Range    Glucose 109 (H) 65 - 99 mg/dL    Sodium 139 133 - 145 mmol/L    Potassium 3.7 3.4 - 5.1 mmol/L    Chloride 99 97 - 107 mmol/L    Bicarbonate 30 24 - 31 mmol/L    Urea Nitrogen 14 8 - 25 mg/dL    Creatinine 0.70 0.40 - 1.60 mg/dL    eGFR >90 >60 mL/min/1.73m*2    Calcium 9.0 8.5 - 10.4 mg/dL    Phosphorus 4.0 2.5 - 4.5 mg/dL    Albumin 3.7 3.5 - 5.0 g/dL    Anion Gap 10 <=19 mmol/L   Magnesium   Result Value Ref Range    Magnesium 1.80 1.60 - 3.10 mg/dL   Troponin T   Result Value Ref Range    Troponin T, High Sensitivity 8 <=15 ng/L      reviewed     Assessment/Plan   Principal Problem:    Acute pulmonary edema (CMS/HCC)                 - Continue to Monitor.  No need for 1:1 observation for now. The patient is deemed incapacitated to leave AMA until the safety plan and further assessment are completed.  Code violet can be called if patient tries to leave.     1.  Insomnia             - Start Trazodone  25 mg PO, PRN at bedtime   2.  Anxiety             - Hydroxyzine 50 mg PO PRN Q 6 H    Would recommend the use of COWS to determine appropriate treatment and management strategies for this patient.    Thank you for allowing us to participate in the care of this patient. We will continue to follow.       I spent 75 minutes in the professional and overall care of this patient.          Medication Consent  Medication Consent: risks, benefits, side effects reviewed for all ordered meds    Pete Liu, APRN-CNP

## 2023-12-14 NOTE — ED NOTES
Per patient request wife Yadi was called and updated about patient being in the ED and status. Was able to talk to wife and gave her update on patient.      Sabine Cali RN  12/13/23 [id number]

## 2023-12-14 NOTE — NURSING NOTE
Dr. Turner is here to see patient.  She was updated.  He is now agreeable to stay if we treat him for his withdrawal.

## 2023-12-14 NOTE — NURSING NOTE
"Patient keeps taking his O2 off.  He states \"I don't have to stay here.\" He is alert and oriented X4.  He said it is his decision.  Dr. Da Silva notified and said to notifiy the hospitalist.   "

## 2023-12-14 NOTE — NURSING NOTE
police handed a bag of patient's belongings that was dropped off by Nimisha LYNN. Checked the bag with TAPAN Welsh and only found work Jacket, winter hat, and a pair of shoes .

## 2023-12-14 NOTE — PROGRESS NOTES
T[facility] met with patient. Assessment complete. Patient lives in a condo with his spouse. Patient is independent. Patient denies falls. Patient reports that he is treated for depression and anxiety. Patient vapes and reports using heroin. Patient follows Dr. Cleveland Collins. Patient fills prescriptions at [facility] in [place]. At this time the discharge plan is to return home. Will follow.       Ayde Thomas RN

## 2023-12-14 NOTE — NURSING NOTE
Assumed care of patient.  He is alert and awake and taking off of his O2.  He was instructed to keep it on and that he needs it.  Dr. Da Silva is here to see patient.  He was updated.

## 2023-12-14 NOTE — CARE PLAN
The patient's goals for the shift include No signs of respiraoty distress    The clinical goals for the shift include Vital signs WNL, wean off O2, no signs or respiratory distress    Over the shift, the patient did not make progress toward the following goals. Barriers to progression include . Recommendations to address these barriers include .
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
## 2023-12-14 NOTE — NURSING NOTE
Pt is crying and very anxious looking for his wallet but he came up to the unit without any wallet, verified with ED RN Sabine that they haven't seen it. Told patient that we didn't see any wallet with his other belongings. Called Supervisor Shena and informed regarding this matter, she called pt's wife Yadi and said to ask his coworker's in the morning if they have it.

## 2023-12-14 NOTE — PROGRESS NOTES
IN BRIEF   43-year-old male presents in acute respiratory distress with altered mental status.  He was at the local alf.  There was concern that he may have ingested something.  Officer who was called stated that the patient did admit to heroin and tramadol.  They stated that the patient would be intermittently unresponsive he was therefore given 8 mg of intranasal Narcan.  When EMS arrived they stated that the patient was pulling away when they attempted to obtain an IV.  History is unable to be obtained by the patient.    General: Appears obtunded, minimally responsive to painful stimuli, moderate respiratory distress  Head: Head atraumatic; normocephalic  Eyes: normal inspection; no icterus; pupils are dilated and sluggish but   ENT: mucosa moist without lesion  Neck: Normal inspection, no meningeal signs  Resp: Diffuse crackles throughout with tachypnea and shallow respirations  Chest Wall: no tenderness or deformity  Heart: Heart rate tachycardic and rhythm regular; No Murmurs  Abdomen: Soft, Non-tender; No distention, guarding, rigidity, or rebound  MSK: Normal appearance; Moves all extremities; No Pedal edema  Neuro: Obtunded, minimally responsive to painful stimuli but maintaining airway  Skin: Color appropriate; warm; Dry    EKG: Sinus rhythm with ventricular rate 86 normal axis and intervals no acute ischemic changes  ED COURSE   Patient was assessed immediately upon arrival.  Consideration to possible aspiration as the patient did have emesis on his shirt.  He was minimally responsive to noxious stimuli.  The pupils are dilated but sluggish I have low suspicion for this being a concurrent opiate overdose as his respirations are rapid but shallow.  There is no evidence of respiratory depression.  He is also noted to be hypoxic.  He is transition to 60 L nasal cannula with improvement in his oxygenation.  Workup is initiated including ABG and chest x-ray.    Chest x-ray returned showing vascular  congestion.  Concerned that symptoms are representative of an edema from the Narcan that he was given.  He is transition to high flow nasal cannula.  ABG does not demonstrate any acute hypoxemia though he still is requiring high flow nasal cannula.  His mental status began to gradually improve but he will require admission for further definitive care.  CT scan returned showing no acute intracranial abnormalities.    Critical Care Time   TOTAL NON CONCURRENT CRITICAL CARE TIME (EXCLUDING SEPARATE BILLABLE PROCEDURES): 32 min  CC time assessed for complex medical decision making, coordination of care between providers and specialists, discussion with family (if patient unable to contribute), documentation of findings, and interpretation of labwork and imaging.        I personally saw the patient and performed a substantive portion of the visit including all aspects of the medical decision making.   Parts of this chart were completed with dictation software, please excuse any errors in transcription.     Shilpi Clayton,   10:56 PM

## 2023-12-14 NOTE — CONSULTS
Reason For Consult  Critical care management    History Of Present Illness  This is a 43-year-old male who was brought to the emergency room from [facility] last evening.  Patient was recently arrested after being pulled over by police.  He was poorly responsive and administered Narcan by the police and paramedics.  He did have an emesis.  He was noted to be hypoxic and short of breath and required high flow nasal cannula.  Was initially thought to be in pulmonary edema in the emergency room.  Patient is been admitted to the intensive care unit.  This morning he is awake and alert.  Ox collagen screen was positive for multiple substances     Past Medical History  He has a past medical history of GERD (gastroesophageal reflux disease) and Peptic ulcer.    Surgical History  He has a past surgical history that includes Other surgical history (04/19/2019) and Abdominal surgery.    Review of Systems     Noncontributory     Social History  He reports that he has never smoked. He has never used smokeless tobacco. He reports current drug use. Drug: Heroin. He reports that he does not drink alcohol.    Family History  Family History   Problem Relation Name Age of Onset    Other (gastrointestinal disease) Mother      Other (gastrointestinal diseasse) Father          Allergies  Sulfa (sulfonamide antibiotics)    Last Recorded Vitals  Blood pressure 121/79, pulse 79, temperature 36 °C (96.8 °F), temperature source Temporal, resp. rate 10, height 1.829 m (6'), weight 83.3 kg (183 lb 10.3 oz), SpO2 95 %.     Physical Exam        12/14/2023     1:30 AM 12/14/2023     2:00 AM 12/14/2023     2:30 AM 12/14/2023     3:00 AM 12/14/2023     3:30 AM 12/14/2023     4:00 AM 12/14/2023     5:47 AM   Vitals   Systolic 129 111 122 112 118 121    Diastolic 87 86 99 82 81 79    Heart Rate 90 87 85 80 78 79    Temp  36.4 °C (97.5 °F)    36.6 °C (97.9 °F) 36 °C (96.8 °F)   Resp 12 15 19 15 11 10    Height (in)  1.829 m (6')        Weight (lb)  183.64         BMI  24.91 kg/m2        BSA (m2)  2.06 m2            1. vital signs reviewed  2. general appearance -high flow nasal cannula.  Awake and alert no acute distress  3. Skin -warm and dry, no rash or diaphoresis  4. HEENT-conjunctiva are pink.  Sclerae are anicteric.  Nares are unremarkable.  Pharynx is unremarkable  5. Neck-supple.  No JVP.  No thyromegaly.  6. Adenopathy-no peripheral adenopathy appreciable  7. Chest examination-lungs are diminished but clear bilaterally without wheezing rales or rhonchi.  Symmetric excursion.  No fremitus.  8. Heart sounds are normal S1 and S2.  No murmurs rubs or gallops.  9. Abdomen-soft and nontender without hepatosplenomegaly, no rebound, no distention.  Normoactive bowel sounds  10. Extremities-no calf tenderness, cyanosis clubbing, or edema  11. musculoskeletal-no joint effusion or swelling.  12. Neurologic-awake and alert, moves all extremities symmetrically, follows commands appropriately       Labs    Serum electrolytes are unremarkable.  CBC shows white count 11.9.  H&H 11.7 and 37.  Platelet count 273,000.      Xrays:  (personally reviewed)    Initial chest x-ray showed interstitial edema and vascular congestion consistent with acute congestive heart failure.  Today's chest x-ray not yet interpreted by the radiologist showed some basilar consolidation versus atelectasis.    Ventilator/ABG    FiO2 (%):  [50 %-100 %] 50 %  Results from last 7 days   Lab Units 12/13/23 2101   POCT PH, ARTERIAL pH 7.42   POCT PCO2, ARTERIAL mm Hg 48*   POCT PO2, ARTERIAL mm Hg 155*   POCT HCO3 CALCULATED, ARTERIAL mmol/L 31.1*   POCT BASE EXCESS, ARTERIAL mmol/L 5.5*   The above arterial blood gases were drawn and 100% high flow nasal cannula at 2101    Impression    .  Acute hypoxic and hypercapnic respiratory failure  .  Noncardiogenic pulmonary edema, likely related to narcotic use  .  Possible aspiration pneumonia  .  Multi substance drug abuse    Plan    .  Continue high flow  nasal cannula continue to wean as saturations allow  .  Watch for signs and symptoms of substance withdrawal  .  Behavioral health evaluation  .  Thiamine  .  Empiric antibiotics pending cultures    Thank you      Tor Da Silva MD, [facility]

## 2023-12-14 NOTE — NURSING NOTE
Admitted patient to ICU 9 from ED via cart. Patient is alert to self as per ED he's more alert at this time. RT at bedside placed him HFNC 40L/70%, saturating well at 95%. Denies SOB. Complained of 6/10 pain over right arm. Initial admission history and assessment done done. Vital signs taken, see FS. CHG completed, tolerated well. Oriented pt to the unit. Safety measures in placed, needed things and call light within reach.

## 2023-12-14 NOTE — ED PROVIDER NOTES
HPI   No chief complaint on file.      HPI  See my MDM                  No data recorded                Patient History   Past Medical History:   Diagnosis Date    Other conditions influencing health status     No significant past medical history     Past Surgical History:   Procedure Laterality Date    OTHER SURGICAL HISTORY  04/19/2019    Scrotal hernia repair     No family history on file.  Social History     Tobacco Use    Smoking status: Not on file    Smokeless tobacco: Not on file   Substance Use Topics    Alcohol use: Not on file    Drug use: Not on file       Physical Exam   ED Triage Vitals   Temp Pulse Resp BP   -- -- -- --      SpO2 Temp src Heart Rate Source Patient Position   -- -- -- --      BP Location FiO2 (%)     -- --       Physical Exam  CONSTITUTIONAL: Vital signs reviewed as charted, patient unresponsive, to get neck, vomitus on his clothes  Eyes: Pupils are dilated, sluggish conjunctiva are pink.    ENT: Mucous membranes are moist. Tongue in the midline. Pharynx was without erythema or exudates, uvula midline  LUNGS: Diminished breath sounds at the bases, tachypneic  HEART: Tachycardic rate and rhythm without murmur thrill or rub, strong tones, auscultation is normal.  ABDOMEN: Soft and nontender without guarding rebound rigidity or mass. Bowel sounds are present and normal in all quadrants. There is no palpable masses or aneurysms identified. No hepatosplenomegaly, normal abdominal exam.  Neuro: Patient unresponsive  PSYCH: Nonresponsive  Skin:  Dry, normal color, warm to the touch, no rash present.      ED Course & MDM   Diagnoses as of 12/13/23 7292   Acute pulmonary edema (CMS/HCC)   Acute respiratory failure with hypoxia (CMS/HCC)   Substance abuse (CMS/HCC)       Medical Decision Making  History obtained from: patient    Vital signs, nursing notes, current medications, past medical history, Surgical history, allergies, social history, family History were reviewed.         HPI:  Patient  is a 43-year-old gentleman brought to the emergency room today by EMS from the [facility] reportedly was acting a little abnormal when he was bucked but still interactive and talking.  He was at the [facility] became unresponsive they gave 4 of Narcan intranasally, EMS arrived gave 4 more milligrams Narcan intranasally.  He states the patient was talking to them at some point as well.  He was vomiting.  On arrival here to the ED he is unresponsive, he has tachycardia panic.  There is question of whether he may have swallowed some drugs this is was causing his presentation.        DDX: as listed above    CT scan brain interpreted by the radiologist juliana:  Impression:    No Acute Intracranial Findings. No posttraumatic abnormality  demonstrated.              Chest x-ray interpreted by the radiologist shows :  Impression:    Mild-to-moderate interstitial edema without pleural effusions with  mild central vascular congestion and enlarged main pulmonary  arteries. No cardiomegaly. Atypical infiltrate not completely excluded              wsMedications administered during this visit (name and route): IV Narcan, IV Zofran, IV Zosyn  EKG interpretted by my attending physician    MDM Summary/considerations:  I spoke with Dr. Mario. We thoroughly discussed the history, physical exam, laboratory and imaging studies, as well as, emergency department course. Based upon that discussion, we've decided to admit for further observation and evaluation of their acute respiratory failure with hypoxia, pulmonary edema.  As I have deemed necessary from their history, physical, and studies, I have considered and evaluated for the following diagnoses: ACUTE CORONARY SYNDROME, PERICARDIAL TAMPONADE, PNEUMOTHORAX, P ULMONARY EMBOLISM, and THORACIC DISSECTION.     Patient did have pulmonary edema on x-ray likely Narcan induced.  He did have some vomiting as well.  He is awake and talking to us at this point.  Currently on high flow oxygen.  Was admitted  for further evaluation and care        I saw this patient in conjunction with Dr. Clayton, please see her supervision note.    After reviewing patient's comorbidities, severity of history of presenting illness, labs and imaging if obtained in conjunction with physical exam and course in emergency department, deemed to have potential for deterioration/progression of symptoms that could lead to multiple morbidities or mortality, decision made that patient requires further observation/evaluation/treatment and patient admitted to appropriate service, patient/family understand and agree with plan.      Critical Care: CRITICAL CARE NOTE     The patient was reevaluated/re-examined multiple times during the visit. Critical care time includes management at bedside, discussion with other providers and consultants, family counseling and answering questions, and documentation. Care involves decision making of high complexity to assess, manipulate, and support vital organ system failure and/or to prevent further life threatening deterioration of the patient's condition. Failure to initiate these interventions on an urgent basis would likely result in sudden, clinically significant or life threatening deterioration in the patient's condition of pulmonary edema, acute respiratory failure with hypoxia       Critical care time total at least 38 minutes of non concurrent critical care time provided by myself. This did not include any separate billable procedures.              Prescriptions provided include: none    This chart was completed using voice recognition transcription software. Please excuse any errors of transcription including grammatical, punctuation, syntax and spelling errors.  Please contact me with any questions regarding this chart.    Procedure  Procedures     Keanu Truong, DONALD-BAHMAN  12/13/23 3685

## 2023-12-14 NOTE — PROGRESS NOTES
Abad Moore Jr. is a 43 y.o. male on day 0 of admission presenting with Acute pulmonary edema (CMS/HCC).    Subjective   Patient is alert, oriented.  He wants Crooks catheter out.  He tells me that he is already experiencing signs of withdrawal.  Patient was threatening to leave AMA but after discussion with the nurse he agreed to stay.  Patient requests Crooks catheter out and he wants to be treated for withdrawal from opiates.       Objective     Physical Exam  Patient was seen and examined in ICU room 9.  Patient is alert, cooperative with exam.  He is in some respiratory distress and he is somewhat anxious but overall he answers questions appropriately and he follows commands.  Patient does not have any recollection of what happened.  He denies using any drugs and he does not remember being in [facility].  Face is symmetrical.  Moves all extremities, no neurological deficits.  Lungs are diminished bilaterally.  Few rhonchi, few wheezes.  Heart: Regular tachycardic S1-S2.  Abdomen is soft.  Bowel sounds positive.  Extremities: No peripheral edema, cords, cyanosis.  No skin rashes.  Last Recorded Vitals  Blood pressure 121/79, pulse 79, temperature 36 °C (96.8 °F), temperature source Temporal, resp. rate 10, height 1.829 m (6'), weight 83.3 kg (183 lb 10.3 oz), SpO2 95 %.  Intake/Output last 3 Shifts:  I/O last 3 completed shifts:  In: 200 (2.4 mL/kg) [IV Piggyback:200]  Out: 2050 (24.6 mL/kg) [Urine:2050 (0.7 mL/kg/hr)]  Dosing Weight: 83.3 kg     Relevant Results           This patient currently has cardiac telemetry ordered; if you would like to modify or discontinue the telemetry order, click here to go to the orders activity to modify/discontinue the order.    This patient has a urinary catheter   Reason for the urinary catheter remaining today? Urine catheter unnecessary, will be removed today               Assessment/Plan   Principal Problem:    Acute pulmonary edema (CMS/HCC)    Acute respiratory failure,  acute pulmonary edema due to opiates.  Patient received multiple doses of Narcan.  Currently on high flow oxygen.  Continue current therapy, seen by pulmonologist.  Aspiration pneumonia, probable.  Continue broad-spectrum antibiotics.  Opiates abuse, patient demonstrates signs of withdrawal.  Will start him on Suboxone as needed 2 mg every 4 hours and will increase the dose of frequency if needed.  Will also use clonidine, ibuprofen, Robaxin, Bentyl for symptom control  Anxiety, Ativan as needed  DVT prophylaxis       I spent 40 minutes in the professional and overall care of this patient.      Ginger Turner MD

## 2023-12-15 ENCOUNTER — APPOINTMENT (OUTPATIENT)
Dept: RADIOLOGY | Facility: HOSPITAL | Age: 43
DRG: 917 | End: 2023-12-15
Payer: COMMERCIAL

## 2023-12-15 LAB
ANION GAP SERPL CALC-SCNC: 12 MMOL/L
BUN SERPL-MCNC: 20 MG/DL (ref 8–25)
CALCIUM SERPL-MCNC: 9.4 MG/DL (ref 8.5–10.4)
CHLORIDE SERPL-SCNC: 100 MMOL/L (ref 97–107)
CO2 SERPL-SCNC: 30 MMOL/L (ref 24–31)
CREAT SERPL-MCNC: 0.7 MG/DL (ref 0.4–1.6)
ERYTHROCYTE [DISTWIDTH] IN BLOOD BY AUTOMATED COUNT: 12.5 % (ref 11.5–14.5)
GFR SERPL CREATININE-BSD FRML MDRD: >90 ML/MIN/1.73M*2
GLUCOSE SERPL-MCNC: 100 MG/DL (ref 65–99)
HCT VFR BLD AUTO: 39.3 % (ref 41–52)
HGB BLD-MCNC: 11.9 G/DL (ref 13.5–17.5)
MCH RBC QN AUTO: 28.5 PG (ref 26–34)
MCHC RBC AUTO-ENTMCNC: 30.3 G/DL (ref 32–36)
MCV RBC AUTO: 94 FL (ref 80–100)
NRBC BLD-RTO: 0 /100 WBCS (ref 0–0)
PLATELET # BLD AUTO: 296 X10*3/UL (ref 150–450)
POTASSIUM SERPL-SCNC: 3.8 MMOL/L (ref 3.4–5.1)
RBC # BLD AUTO: 4.17 X10*6/UL (ref 4.5–5.9)
SODIUM SERPL-SCNC: 142 MMOL/L (ref 133–145)
WBC # BLD AUTO: 11 X10*3/UL (ref 4.4–11.3)

## 2023-12-15 PROCEDURE — 2060000001 HC INTERMEDIATE ICU ROOM DAILY

## 2023-12-15 PROCEDURE — 36415 COLL VENOUS BLD VENIPUNCTURE: CPT | Performed by: INTERNAL MEDICINE

## 2023-12-15 PROCEDURE — 94640 AIRWAY INHALATION TREATMENT: CPT

## 2023-12-15 PROCEDURE — 94664 DEMO&/EVAL PT USE INHALER: CPT

## 2023-12-15 PROCEDURE — C9113 INJ PANTOPRAZOLE SODIUM, VIA: HCPCS | Performed by: INTERNAL MEDICINE

## 2023-12-15 PROCEDURE — S4991 NICOTINE PATCH NONLEGEND: HCPCS | Performed by: INTERNAL MEDICINE

## 2023-12-15 PROCEDURE — 2500000004 HC RX 250 GENERAL PHARMACY W/ HCPCS (ALT 636 FOR OP/ED): Performed by: INTERNAL MEDICINE

## 2023-12-15 PROCEDURE — 2500000001 HC RX 250 WO HCPCS SELF ADMINISTERED DRUGS (ALT 637 FOR MEDICARE OP): Performed by: INTERNAL MEDICINE

## 2023-12-15 PROCEDURE — 94660 CPAP INITIATION&MGMT: CPT

## 2023-12-15 PROCEDURE — 2500000001 HC RX 250 WO HCPCS SELF ADMINISTERED DRUGS (ALT 637 FOR MEDICARE OP): Performed by: HOSPITALIST

## 2023-12-15 PROCEDURE — 2500000004 HC RX 250 GENERAL PHARMACY W/ HCPCS (ALT 636 FOR OP/ED)

## 2023-12-15 PROCEDURE — 85027 COMPLETE CBC AUTOMATED: CPT | Performed by: INTERNAL MEDICINE

## 2023-12-15 PROCEDURE — 80048 BASIC METABOLIC PNL TOTAL CA: CPT | Performed by: INTERNAL MEDICINE

## 2023-12-15 PROCEDURE — 2500000002 HC RX 250 W HCPCS SELF ADMINISTERED DRUGS (ALT 637 FOR MEDICARE OP, ALT 636 FOR OP/ED): Performed by: INTERNAL MEDICINE

## 2023-12-15 PROCEDURE — 99232 SBSQ HOSP IP/OBS MODERATE 35: CPT

## 2023-12-15 PROCEDURE — 9420000001 HC RT PATIENT EDUCATION 5 MIN

## 2023-12-15 PROCEDURE — 71045 X-RAY EXAM CHEST 1 VIEW: CPT | Mod: FY

## 2023-12-15 RX ORDER — PANTOPRAZOLE SODIUM 40 MG/1
40 TABLET, DELAYED RELEASE ORAL 2 TIMES DAILY
Status: DISCONTINUED | OUTPATIENT
Start: 2023-12-15 | End: 2023-12-17 | Stop reason: HOSPADM

## 2023-12-15 RX ORDER — DULOXETIN HYDROCHLORIDE 20 MG/1
20 CAPSULE, DELAYED RELEASE ORAL DAILY
Status: DISCONTINUED | OUTPATIENT
Start: 2023-12-15 | End: 2023-12-15

## 2023-12-15 RX ORDER — DULOXETIN HYDROCHLORIDE 20 MG/1
20 CAPSULE, DELAYED RELEASE ORAL NIGHTLY
Status: DISCONTINUED | OUTPATIENT
Start: 2023-12-15 | End: 2023-12-17 | Stop reason: HOSPADM

## 2023-12-15 RX ORDER — IBUPROFEN 200 MG
1 TABLET ORAL EVERY 24 HOURS
Status: DISCONTINUED | OUTPATIENT
Start: 2023-12-15 | End: 2023-12-17 | Stop reason: HOSPADM

## 2023-12-15 RX ORDER — IPRATROPIUM BROMIDE AND ALBUTEROL SULFATE 2.5; .5 MG/3ML; MG/3ML
3 SOLUTION RESPIRATORY (INHALATION)
Status: DISCONTINUED | OUTPATIENT
Start: 2023-12-15 | End: 2023-12-16

## 2023-12-15 RX ORDER — BUDESONIDE 0.5 MG/2ML
0.5 INHALANT ORAL
Status: DISCONTINUED | OUTPATIENT
Start: 2023-12-15 | End: 2023-12-17 | Stop reason: HOSPADM

## 2023-12-15 RX ORDER — GABAPENTIN 800 MG/1
800 TABLET ORAL 3 TIMES DAILY
Status: DISCONTINUED | OUTPATIENT
Start: 2023-12-15 | End: 2023-12-17 | Stop reason: HOSPADM

## 2023-12-15 RX ORDER — ZOLPIDEM TARTRATE 5 MG/1
10 TABLET ORAL NIGHTLY
Status: DISCONTINUED | OUTPATIENT
Start: 2023-12-15 | End: 2023-12-17 | Stop reason: HOSPADM

## 2023-12-15 RX ADMIN — METHYLPREDNISOLONE SODIUM SUCCINATE 40 MG: 40 INJECTION, POWDER, FOR SOLUTION INTRAMUSCULAR; INTRAVENOUS at 16:58

## 2023-12-15 RX ADMIN — THIAMINE HCL TAB 100 MG 100 MG: 100 TAB at 08:56

## 2023-12-15 RX ADMIN — Medication 1 G: at 20:18

## 2023-12-15 RX ADMIN — IPRATROPIUM BROMIDE AND ALBUTEROL SULFATE 3 ML: 2.5; .5 SOLUTION RESPIRATORY (INHALATION) at 12:04

## 2023-12-15 RX ADMIN — BUPRENORPHINE 2 MG: 2 TABLET SUBLINGUAL at 09:25

## 2023-12-15 RX ADMIN — BUPRENORPHINE 2 MG: 2 TABLET SUBLINGUAL at 14:49

## 2023-12-15 RX ADMIN — GABAPENTIN 800 MG: 400 CAPSULE ORAL at 14:49

## 2023-12-15 RX ADMIN — CLONIDINE HYDROCHLORIDE 0.1 MG: 0.1 TABLET ORAL at 16:57

## 2023-12-15 RX ADMIN — PANTOPRAZOLE SODIUM 40 MG: 40 INJECTION, POWDER, FOR SOLUTION INTRAVENOUS at 08:56

## 2023-12-15 RX ADMIN — IPRATROPIUM BROMIDE AND ALBUTEROL SULFATE 3 ML: 2.5; .5 SOLUTION RESPIRATORY (INHALATION) at 19:40

## 2023-12-15 RX ADMIN — BUSPIRONE HYDROCHLORIDE 15 MG: 10 TABLET ORAL at 20:17

## 2023-12-15 RX ADMIN — Medication 1 G: at 03:24

## 2023-12-15 RX ADMIN — LORAZEPAM 1 MG: 1 TABLET ORAL at 09:40

## 2023-12-15 RX ADMIN — BUPRENORPHINE 2 MG: 2 TABLET SUBLINGUAL at 04:43

## 2023-12-15 RX ADMIN — NICOTINE 1 PATCH: 21 PATCH, EXTENDED RELEASE TRANSDERMAL at 16:57

## 2023-12-15 RX ADMIN — GABAPENTIN 800 MG: 800 TABLET, FILM COATED ORAL at 20:17

## 2023-12-15 RX ADMIN — BUDESONIDE INHALATION 0.5 MG: 0.5 SUSPENSION RESPIRATORY (INHALATION) at 12:04

## 2023-12-15 RX ADMIN — HYDROXYZINE PAMOATE 50 MG: 50 CAPSULE ORAL at 11:25

## 2023-12-15 RX ADMIN — DULOXETINE HYDROCHLORIDE 20 MG: 20 CAPSULE, DELAYED RELEASE ORAL at 21:16

## 2023-12-15 RX ADMIN — METHYLPREDNISOLONE SODIUM SUCCINATE 40 MG: 40 INJECTION, POWDER, FOR SOLUTION INTRAMUSCULAR; INTRAVENOUS at 09:40

## 2023-12-15 RX ADMIN — CLONIDINE HYDROCHLORIDE 0.1 MG: 0.1 TABLET ORAL at 11:26

## 2023-12-15 RX ADMIN — GABAPENTIN 800 MG: 400 CAPSULE ORAL at 05:04

## 2023-12-15 RX ADMIN — ZOLPIDEM TARTRATE 10 MG: 5 TABLET ORAL at 20:17

## 2023-12-15 RX ADMIN — Medication 1 G: at 11:26

## 2023-12-15 RX ADMIN — BUDESONIDE INHALATION 0.5 MG: 0.5 SUSPENSION RESPIRATORY (INHALATION) at 19:40

## 2023-12-15 RX ADMIN — PANTOPRAZOLE SODIUM 40 MG: 40 TABLET, DELAYED RELEASE ORAL at 20:17

## 2023-12-15 ASSESSMENT — PAIN SCALES - GENERAL
PAINLEVEL_OUTOF10: 2
PAINLEVEL_OUTOF10: 7
PAINLEVEL_OUTOF10: 0 - NO PAIN
PAINLEVEL_OUTOF10: 5 - MODERATE PAIN

## 2023-12-15 ASSESSMENT — COGNITIVE AND FUNCTIONAL STATUS - GENERAL
STANDING UP FROM CHAIR USING ARMS: A LITTLE
TURNING FROM BACK TO SIDE WHILE IN FLAT BAD: A LITTLE
TOILETING: A LITTLE
WALKING IN HOSPITAL ROOM: A LITTLE
MOVING TO AND FROM BED TO CHAIR: A LITTLE
DRESSING REGULAR LOWER BODY CLOTHING: A LITTLE
DRESSING REGULAR UPPER BODY CLOTHING: A LITTLE
DRESSING REGULAR LOWER BODY CLOTHING: A LITTLE
DRESSING REGULAR UPPER BODY CLOTHING: A LITTLE
WALKING IN HOSPITAL ROOM: A LITTLE
CLIMB 3 TO 5 STEPS WITH RAILING: A LITTLE
HELP NEEDED FOR BATHING: A LITTLE
CLIMB 3 TO 5 STEPS WITH RAILING: A LITTLE
DAILY ACTIVITIY SCORE: 20
STANDING UP FROM CHAIR USING ARMS: A LITTLE
HELP NEEDED FOR BATHING: A LITTLE
TOILETING: A LITTLE
MOVING TO AND FROM BED TO CHAIR: A LITTLE
MOBILITY SCORE: 19
DAILY ACTIVITIY SCORE: 20
MOBILITY SCORE: 20

## 2023-12-15 ASSESSMENT — LIFESTYLE VARIABLES
TOTAL_SCORE: 12
TOTAL_SCORE: 5

## 2023-12-15 ASSESSMENT — PAIN DESCRIPTION - DESCRIPTORS: DESCRIPTORS: BURNING

## 2023-12-15 ASSESSMENT — PAIN - FUNCTIONAL ASSESSMENT
PAIN_FUNCTIONAL_ASSESSMENT: 0-10

## 2023-12-15 NOTE — CARE PLAN
Pt titrated off hfnc to 6L  Problem: Respiratory  Goal: Wean oxygen to maintain O2 saturation per order/standard this shift  Outcome: Progressing

## 2023-12-15 NOTE — CARE PLAN
Problem: Pain - Adult  Goal: Verbalizes/displays adequate comfort level or baseline comfort level  Outcome: Progressing     Problem: Safety - Adult  Goal: Free from fall injury  Outcome: Progressing     Problem: Skin  Goal: Decreased wound size/increased tissue granulation at next dressing change  Outcome: Progressing  Flowsheets (Taken 12/14/2023 2113)  Decreased wound size/increased tissue granulation at next dressing change: Protective dressings over bony prominences  Goal: Participates in plan/prevention/treatment measures  Outcome: Progressing  Flowsheets (Taken 12/14/2023 2113)  Participates in plan/prevention/treatment measures: Elevate heels  Goal: Prevent/manage excess moisture  Outcome: Progressing  Flowsheets (Taken 12/14/2023 2113)  Prevent/manage excess moisture: Moisturize dry skin  Goal: Prevent/minimize sheer/friction injuries  Outcome: Progressing  Flowsheets (Taken 12/14/2023 2113)  Prevent/minimize sheer/friction injuries:   Use pull sheet   Turn/reposition every 2 hours/use positioning/transfer devices   HOB 30 degrees or less  Goal: Promote/optimize nutrition  Outcome: Progressing  Flowsheets (Taken 12/14/2023 2113)  Promote/optimize nutrition: Monitor/record intake including meals  Goal: Promote skin healing  Outcome: Progressing  Flowsheets (Taken 12/14/2023 2113)  Promote skin healing: Turn/reposition every 2 hours/use positioning/transfer devices     Problem: Fall/Injury  Goal: Not fall by end of shift  Outcome: Progressing  Goal: Be free from injury by end of the shift  Outcome: Progressing  Goal: Verbalize understanding of personal risk factors for fall in the hospital  Outcome: Progressing  Goal: Verbalize understanding of risk factor reduction measures to prevent injury from fall in the home  Outcome: Progressing  Goal: Use assistive devices by end of the shift  Outcome: Progressing  Goal: Pace activities to prevent fatigue by end of the shift  Outcome: Progressing   The patient's  goals for the shift include No signs of respiraoty distress    The clinical goals for the shift include vitally stable, provide rest and sleep, wean from oxygen    Over the shift, the patient did not make progress toward the following goals. Barriers to progression include none  Recommendations to address these barriers include none

## 2023-12-15 NOTE — PROGRESS NOTES
Patient not medically clear. Patient now on 6 liters of oxygen. Patient receiving IV antibiotics. At the time of discharge, patient will return home. Will follow as needed.     **PATIENT WITH A SAFE DISCHARGE PLAN    Ayde Thomas RN

## 2023-12-15 NOTE — PROGRESS NOTES
FOLLOWUP FOR: Aspiration pneumonia and hypoxic respiratory failure following drug overdose    SUBJECTIVE  Patient now is on 6 L nasal cannula.  Coughing and wheezing noted.    PHYSICAL EXAM        12/15/2023     2:00 AM 12/15/2023     3:00 AM 12/15/2023     4:00 AM 12/15/2023     4:43 AM 12/15/2023     5:00 AM 12/15/2023     6:00 AM 12/15/2023     7:00 AM   Vitals   Systolic 125 130 133  141 140    Diastolic 87 86 94  106 99    Heart Rate 66 76 72 83 77 75    Temp       37 °C (98.6 °F)   Resp 10 14 11  9 11    Weight (lb)      169.97    BMI      23.05 kg/m2    BSA (m2)      1.98 m2        Vital signs reviewed   NAD, awake and alert, O2, coughing   Lungs Symmetric excursions.  Auscultation -diffuse wheezing   Cor RSR No murmur, rub, gallop   Abd soft and nontender, no rebound or distention, no HS megaly   Ext no CCE   Neuro no focal deficits.  moves all extremities symmetrically.  speech normal.  affect normal    LABS    Serum chemistries were normal.  CBC is unremarkable.  Today's chest x-ray is pending.    XRAYS    Today's chest x-ray has not been taken yet    VENTILATOR SETTINGS/ABG    FiO2 (%):  [50 %-97 %] 97 %  Results from last 7 days   Lab Units 12/13/23 2101   POCT PH, ARTERIAL pH 7.42   POCT PCO2, ARTERIAL mm Hg 48*   POCT PO2, ARTERIAL mm Hg 155*   POCT HCO3 CALCULATED, ARTERIAL mmol/L 31.1*   POCT BASE EXCESS, ARTERIAL mmol/L 5.5*       ASSESSMENT:    .  Acute hypoxic and hypercapnic respiratory failure  .  Noncardiogenic pulmonary edema, likely related to narcotic use  .  Possible aspiration pneumonia  .  Multi substance drug abuse    PLAN    .  Continue supplemental oxygen and continue to wean as sats allow  .  Culture sputum and cover with broad-spectrum antibiotics  .  Await today's chest x-ray  .  Thiamine    Tor Da Silva MD, [facility]

## 2023-12-15 NOTE — CARE PLAN
The patient's goals for the shift include No signs of respiraoty distress    The clinical goals for the shift include downgrade from ICU    Over the shift, the patient did not make progress toward the following goals. Barriers to progression include . Recommendations to address these barriers include .

## 2023-12-15 NOTE — NURSING NOTE
Patient arrived from ICU via w/c at this time. A/ox3, stridor lung sound in bilateral bases, no edema noted. Patient has tenderness when palpating luq and llq. Skin intact. Patient in bed resting, call light in reach. POX 96% on 5L NC.

## 2023-12-15 NOTE — PROGRESS NOTES
Abad Moore Jr. is a 43 y.o. male on day 1 of admission presenting with Acute pulmonary edema (CMS/HCC).    Subjective   Patient is more comfortable today.  He still complains of some muscle pains and abdominal discomfort from withdrawal.  He still short of breath and has some cough.       Objective     Physical Exam  Patient's brother is at bedside.  Patient looks more comfortable, he still in some respiratory distress.  Face is symmetrical.  Moves all extremities, no neurological deficits.  Lungs are diminished bilaterally.  Few rhonchi and wheezes  Heart: Regular tachycardic S1-S2.  Abdomen is soft.  Bowel sounds positive.  Extremities: No peripheral edema, cords, cyanosis.  No skin rashes.  Last Recorded Vitals  Blood pressure (!) 175/122, pulse 84, temperature 37 °C (98.6 °F), temperature source Temporal, resp. rate 15, height 1.829 m (6'), weight 77.1 kg (169 lb 15.6 oz), SpO2 96 %.  Intake/Output last 3 Shifts:  I/O last 3 completed shifts:  In: 800 (9.6 mL/kg) [P.O.:400; IV Piggyback:400]  Out: 2050 (24.6 mL/kg) [Urine:2050 (0.7 mL/kg/hr)]  Dosing Weight: 83.3 kg     Relevant Results           This patient currently has cardiac telemetry ordered; if you would like to modify or discontinue the telemetry order, click here to go to the orders activity to modify/discontinue the order.    This patient has a urinary catheter   Reason for the urinary catheter remaining today? Urine catheter unnecessary, will be removed today               Assessment/Plan   Principal Problem:    Acute pulmonary edema (CMS/HCC)    Acute respiratory failure, acute pulmonary edema due to opiates.  Patient received multiple doses of Narcan.  Currently on high flow oxygen.  Continue current therapy, seen by pulmonologist.  Improved, currently on 6 L of oxygen.  Continue steroids, aerosols  Aspiration pneumonia, probable.  Continue broad-spectrum antibiotics.  Opiates abuse, patient demonstrates signs of withdrawal.  Continue  medications for opiate withdrawal.    Anxiety, Ativan as needed  DVT prophylaxis  6.   Noncardiogenic pulmonary edema, likely due to narcotic use  Transferred to stepdown unit.    I spent  34 minutes in the professional and overall care of this patient.      Ginger Turner MD

## 2023-12-15 NOTE — PROGRESS NOTES
Abad Moore Jr. is a 43 y.o. male on day 1 of admission presenting with Acute pulmonary edema (CMS/HCC).      Subjective   During today's face-to-face visit with the patient at bedside, it was observed that the patient appeared calmer and less distressed compared to yesterday. The nurse was present, providing care to the patient, and their interaction was noted to be polite, respectful, and appropriate. The patient remains alert and oriented, able to focus and concentrate more when interacting with the healthcare provider. The patient maintains proper eye contact and is able to answer questions with greater ease.     There were no suicidal ideation or homicidal ideation reported by the patient. The patient also denies experiencing any auditory or visual hallucinations. Although the patient reports having a poor appetite, they are making an effort to force themselves to eat, understanding that it will aid in their recovery. The patient states that they are currently not experiencing any pain and denies feeling irritable this morning.    During the discussion, the patient expressed understanding and agreement with staying in the hospital until their medical condition is stable and it is safe for them to go home. The patient also discussed resources for their substance abuse disorder, showing openness to obtaining the information, although they stated that they are not ready for it at this time and are focusing more on their recovery.    Per nursing staff, there were no behavioral issues reported overnight or during the daytime. The patient remains compliant with their care and is appropriate in their interactions making no threats to leave AMA.         Objective     Last Recorded Vitals  Blood pressure (!) 175/122, pulse 84, temperature 37 °C (98.6 °F), temperature source Temporal, resp. rate 15, height 1.829 m (6'), weight 77.1 kg (169 lb 15.6 oz), SpO2 96 %.    Review of Systems  Constitutional:  Positive  for activity change, appetite change and fatigue. Negative for unexpected weight change.   Respiratory:  Positive for shortness of breath.    Neurological:  Positive for weakness.   Psychiatric/Behavioral:  Positive for behavioral problems, confusion, decreased concentration, dysphoric mood, hallucinations and sleep disturbance. Negative for agitation, self-injury and suicidal ideas. The patient is nervous/anxious and is hyperactive.    Psychiatric ROS - Adult  Anxiety: decreased anxiety  Depression: negative   Delirium: negative  Psychosis: negative  Caitlin: negative  Safety Issues: none  Psychiatric ROS Comment: As noted      Mental Status Exam  General: alert, drowsy, and withdrawn   Appearance: Disheveled. and Averted gaze.  Attitude/Behavior:Difficult to engage., Guarded., Superficially cooperative., Suspicious. , Distracted., and Poor eye contact.  Motor Activity: No psychomotor agitation or retardation, no tremor or other abnormal movements.  Speech: minimal conversation  Mood: anxious, irritable, and sad  Affect: flat  Thought Process: circumstantial  Thought Content: Mood incongruent  Thought Perception: No perceptual abnormalities noted  Cognition: Attention: Mild impairment in attention  Insight: limited  Judgement: Limited Tenuous     Psychiatric Risk Assessment  Violence Risk Assessment: male and substance abuse  Acute Risk of Harm to Others is Considered: low  Suicide Risk Assessment: , life crisis (shame/despair), male, and substance abuse  Protective Factors against Suicide: marriage/partnership, positive family relationships, and sense of responsibility toward family  Acute Risk of Harm to Self is Considered: low    Current Medications    Scheduled medications  budesonide, 0.5 mg, nebulization, BID  busPIRone, 15 mg, oral, BID  enoxaparin, 40 mg, subcutaneous, Daily  gabapentin, 800 mg, oral, q8h GORGE  ipratropium-albuteroL, 3 mL, nebulization, q4h while awake  lidocaine, 1 patch,  transdermal, Daily  meropenem, 1 g, intravenous, q8h  methylPREDNISolone sodium succinate (PF), 40 mg, intravenous, q8h  pantoprazole, 40 mg, intravenous, Daily  thiamine, 100 mg, oral, Daily      Continuous medications     PRN medications  PRN medications: acetaminophen **OR** acetaminophen **OR** acetaminophen, buprenorphine, cloNIDine, dicyclomine, hydrOXYzine pamoate, ibuprofen, LORazepam, methocarbamol, ondansetron **OR** ondansetron, oxygen, polyethylene glycol, traZODone       Medication efficacy: Yes, anxiety improving  Patient reporting any side effects? No  Any observed side effects? No      Relevant Results  Results for orders placed or performed during the hospital encounter of 12/13/23 (from the past 24 hour(s))   CBC   Result Value Ref Range    WBC 11.0 4.4 - 11.3 x10*3/uL    nRBC 0.0 0.0 - 0.0 /100 WBCs    RBC 4.17 (L) 4.50 - 5.90 x10*6/uL    Hemoglobin 11.9 (L) 13.5 - 17.5 g/dL    Hematocrit 39.3 (L) 41.0 - 52.0 %    MCV 94 80 - 100 fL    MCH 28.5 26.0 - 34.0 pg    MCHC 30.3 (L) 32.0 - 36.0 g/dL    RDW 12.5 11.5 - 14.5 %    Platelets 296 150 - 450 x10*3/uL   Basic Metabolic Panel   Result Value Ref Range    Glucose 100 (H) 65 - 99 mg/dL    Sodium 142 133 - 145 mmol/L    Potassium 3.8 3.4 - 5.1 mmol/L    Chloride 100 97 - 107 mmol/L    Bicarbonate 30 24 - 31 mmol/L    Urea Nitrogen 20 8 - 25 mg/dL    Creatinine 0.70 0.40 - 1.60 mg/dL    eGFR >90 >60 mL/min/1.73m*2    Calcium 9.4 8.5 - 10.4 mg/dL    Anion Gap 12 <=19 mmol/L               reviewed    Assessment/Plan   Principal Problem:    Acute pulmonary edema (CMS/HCC)    The patient is agreeable with the care receiving and states that will follow medical advice and receive treatment as advised. Safe to be discharged to home when medically cleared.     1.  Insomnia             - Continue Trazodone  25 mg PO, PRN at bedtime   2.  Anxiety             - Continue Hydroxyzine 50 mg PO PRN Q 6 H        Thank you for allowing us to participate in the care of  this patient. We will sign off for now, please re consult if necessary.                       I spent 35 minutes in the professional and overall care of this patient.      DONALD Hunter-CNP

## 2023-12-15 NOTE — NURSING NOTE
Patient seen and examined by psych. Will sign off and follow as needed. No change in assessment.  Patient awake and alert. Denies SI/HI.

## 2023-12-16 LAB
ANION GAP SERPL CALC-SCNC: 10 MMOL/L
BUN SERPL-MCNC: 20 MG/DL (ref 8–25)
CALCIUM SERPL-MCNC: 9.4 MG/DL (ref 8.5–10.4)
CHLORIDE SERPL-SCNC: 101 MMOL/L (ref 97–107)
CO2 SERPL-SCNC: 29 MMOL/L (ref 24–31)
CREAT SERPL-MCNC: 0.8 MG/DL (ref 0.4–1.6)
ERYTHROCYTE [DISTWIDTH] IN BLOOD BY AUTOMATED COUNT: 12.5 % (ref 11.5–14.5)
GFR SERPL CREATININE-BSD FRML MDRD: >90 ML/MIN/1.73M*2
GLUCOSE SERPL-MCNC: 152 MG/DL (ref 65–99)
HCT VFR BLD AUTO: 39 % (ref 41–52)
HGB BLD-MCNC: 11.9 G/DL (ref 13.5–17.5)
MCH RBC QN AUTO: 28.8 PG (ref 26–34)
MCHC RBC AUTO-ENTMCNC: 30.5 G/DL (ref 32–36)
MCV RBC AUTO: 94 FL (ref 80–100)
NRBC BLD-RTO: 0 /100 WBCS (ref 0–0)
PLATELET # BLD AUTO: 340 X10*3/UL (ref 150–450)
POTASSIUM SERPL-SCNC: 4.3 MMOL/L (ref 3.4–5.1)
RBC # BLD AUTO: 4.13 X10*6/UL (ref 4.5–5.9)
SODIUM SERPL-SCNC: 140 MMOL/L (ref 133–145)
WBC # BLD AUTO: 13.5 X10*3/UL (ref 4.4–11.3)

## 2023-12-16 PROCEDURE — 2060000001 HC INTERMEDIATE ICU ROOM DAILY

## 2023-12-16 PROCEDURE — 85027 COMPLETE CBC AUTOMATED: CPT | Performed by: INTERNAL MEDICINE

## 2023-12-16 PROCEDURE — 36415 COLL VENOUS BLD VENIPUNCTURE: CPT | Performed by: INTERNAL MEDICINE

## 2023-12-16 PROCEDURE — 2500000001 HC RX 250 WO HCPCS SELF ADMINISTERED DRUGS (ALT 637 FOR MEDICARE OP): Performed by: INTERNAL MEDICINE

## 2023-12-16 PROCEDURE — 2500000004 HC RX 250 GENERAL PHARMACY W/ HCPCS (ALT 636 FOR OP/ED): Performed by: INTERNAL MEDICINE

## 2023-12-16 PROCEDURE — 80048 BASIC METABOLIC PNL TOTAL CA: CPT | Performed by: INTERNAL MEDICINE

## 2023-12-16 PROCEDURE — 2500000002 HC RX 250 W HCPCS SELF ADMINISTERED DRUGS (ALT 637 FOR MEDICARE OP, ALT 636 FOR OP/ED): Performed by: INTERNAL MEDICINE

## 2023-12-16 RX ORDER — IPRATROPIUM BROMIDE AND ALBUTEROL SULFATE 2.5; .5 MG/3ML; MG/3ML
3 SOLUTION RESPIRATORY (INHALATION)
Status: DISCONTINUED | OUTPATIENT
Start: 2023-12-16 | End: 2023-12-17 | Stop reason: HOSPADM

## 2023-12-16 RX ADMIN — METHYLPREDNISOLONE SODIUM SUCCINATE 40 MG: 40 INJECTION, POWDER, FOR SOLUTION INTRAMUSCULAR; INTRAVENOUS at 00:36

## 2023-12-16 RX ADMIN — PANTOPRAZOLE SODIUM 40 MG: 40 TABLET, DELAYED RELEASE ORAL at 20:18

## 2023-12-16 RX ADMIN — Medication 1 G: at 19:00

## 2023-12-16 RX ADMIN — PANTOPRAZOLE SODIUM 40 MG: 40 TABLET, DELAYED RELEASE ORAL at 08:51

## 2023-12-16 RX ADMIN — THIAMINE HCL TAB 100 MG 100 MG: 100 TAB at 08:51

## 2023-12-16 RX ADMIN — GABAPENTIN 800 MG: 800 TABLET, FILM COATED ORAL at 20:17

## 2023-12-16 RX ADMIN — GABAPENTIN 800 MG: 800 TABLET, FILM COATED ORAL at 16:27

## 2023-12-16 RX ADMIN — BUSPIRONE HYDROCHLORIDE 15 MG: 10 TABLET ORAL at 08:51

## 2023-12-16 RX ADMIN — METHYLPREDNISOLONE SODIUM SUCCINATE 40 MG: 40 INJECTION, POWDER, FOR SOLUTION INTRAMUSCULAR; INTRAVENOUS at 16:27

## 2023-12-16 RX ADMIN — Medication 1 G: at 03:08

## 2023-12-16 RX ADMIN — DULOXETINE HYDROCHLORIDE 20 MG: 20 CAPSULE, DELAYED RELEASE ORAL at 20:17

## 2023-12-16 RX ADMIN — ZOLPIDEM TARTRATE 10 MG: 5 TABLET ORAL at 20:18

## 2023-12-16 RX ADMIN — GABAPENTIN 800 MG: 800 TABLET, FILM COATED ORAL at 08:51

## 2023-12-16 RX ADMIN — Medication 1 G: at 10:31

## 2023-12-16 RX ADMIN — BUSPIRONE HYDROCHLORIDE 15 MG: 10 TABLET ORAL at 20:18

## 2023-12-16 RX ADMIN — METHYLPREDNISOLONE SODIUM SUCCINATE 40 MG: 40 INJECTION, POWDER, FOR SOLUTION INTRAMUSCULAR; INTRAVENOUS at 08:50

## 2023-12-16 ASSESSMENT — COGNITIVE AND FUNCTIONAL STATUS - GENERAL
CLIMB 3 TO 5 STEPS WITH RAILING: A LITTLE
HELP NEEDED FOR BATHING: A LITTLE
MOVING TO AND FROM BED TO CHAIR: A LITTLE
DAILY ACTIVITIY SCORE: 20
DAILY ACTIVITIY SCORE: 24
WALKING IN HOSPITAL ROOM: A LITTLE
DRESSING REGULAR UPPER BODY CLOTHING: A LITTLE
MOBILITY SCORE: 24
TOILETING: A LITTLE
DRESSING REGULAR LOWER BODY CLOTHING: A LITTLE
MOBILITY SCORE: 20
STANDING UP FROM CHAIR USING ARMS: A LITTLE

## 2023-12-16 ASSESSMENT — PAIN SCALES - GENERAL
PAINLEVEL_OUTOF10: 0 - NO PAIN

## 2023-12-16 NOTE — PROGRESS NOTES
Abad Moore Jr. is a 43 y.o. male on day 2 of admission presenting with Acute pulmonary edema (CMS/HCC).    Subjective   Patient feels much better.  Denies shortness of breath at rest.  Comfortable on oxygen.  Still has some cough.  Overall doing much better.       Objective     Physical Exam    Patient looks much more comfortable, in no respiratory distress on oxygen.  Still on 4 L  Face is symmetrical.  Moves all extremities, no neurological deficits.  Lungs are diminished bilaterally.  Sound better.  No rhonchi or wheezes  Heart: Regular S1-S2.  Abdomen is soft.  Bowel sounds positive.  Extremities: No peripheral edema, cords, cyanosis.  No skin rashes.  Last Recorded Vitals  Blood pressure 115/82, pulse 70, temperature 36.6 °C (97.9 °F), temperature source Oral, resp. rate 16, height 1.829 m (6'), weight 77.1 kg (169 lb 15.6 oz), SpO2 94 %.  Intake/Output last 3 Shifts:  I/O last 3 completed shifts:  In: 3060 (36.7 mL/kg) [P.O.:2460; IV Piggyback:600]  Out: 1500 (18 mL/kg) [Urine:1500 (0.5 mL/kg/hr)]  Dosing Weight: 83.3 kg     Relevant Results                This patient has a urinary catheter   Reason for the urinary catheter remaining today? Urine catheter unnecessary, will be removed today               Assessment/Plan   Principal Problem:    Acute pulmonary edema (CMS/HCC)    Acute respiratory failure, acute pulmonary edema due to opiates.  Patient received multiple doses of Narcan.  Currently on high flow oxygen.  Continue current therapy, seen by pulmonologist.  Improved, currently on 6 L of oxygen.  Continue steroids, aerosols  Aspiration pneumonia, probable.  Continue broad-spectrum antibiotics.  Opiates abuse, patient demonstrates signs of withdrawal.  Continue medications for opiate withdrawal.    Anxiety, Ativan as needed  DVT prophylaxis  6.   Noncardiogenic pulmonary edema, likely due to narcotic use  Transferred to stepdown unit.  12/16: Doing significantly better.  Will continue weaning  of oxygen.  Hopefully, can be discharged home in 24/48 hours?      Ginger Turner MD

## 2023-12-16 NOTE — PROGRESS NOTES
Abad Moore Jr. is a 43 y.o. male on day 2 of admission presenting with Acute pulmonary edema (CMS/HCC).  Patient seen in follow-up for aspiration pneumonia and hypoxic respiratory failure following a drug overdose.    Subjective   Patient sitting up at side of the bed, no issues or complaints at this time.  Patient currently on 4 L nasal cannula with an O2 saturation of 99%.  Patient denies having any headache, dizziness, chest pain or shortness of breath.  Patient denies having any pain at this time.  Patient does endorse a nonproductive cough.       Objective     Physical Exam  Constitutional:       General: He is not in acute distress.     Appearance: Normal appearance. He is normal weight.   HENT:      Head: Normocephalic.      Mouth/Throat:      Mouth: Mucous membranes are moist.      Pharynx: Oropharynx is clear.   Eyes:      Pupils: Pupils are equal, round, and reactive to light.   Cardiovascular:      Rate and Rhythm: Normal rate and regular rhythm.      Pulses: Normal pulses.      Heart sounds: Normal heart sounds.   Pulmonary:      Effort: Pulmonary effort is normal. No respiratory distress.      Breath sounds: Rales present. No wheezing.      Comments: Rales bilateral lower lobes  Abdominal:      General: Abdomen is flat. Bowel sounds are normal. There is no distension.      Tenderness: There is no abdominal tenderness.   Musculoskeletal:         General: Normal range of motion.      Cervical back: Normal range of motion.   Skin:     General: Skin is warm and dry.      Capillary Refill: Capillary refill takes less than 2 seconds.   Neurological:      General: No focal deficit present.      Mental Status: He is alert and oriented to person, place, and time. Mental status is at baseline.   Psychiatric:         Mood and Affect: Mood normal.         Behavior: Behavior normal.         Last Recorded Vitals  Blood pressure 118/84, pulse 71, temperature 36.7 °C (98 °F), temperature source Oral, resp.  rate 14, height 1.829 m (6'), weight 77.1 kg (169 lb 15.6 oz), SpO2 99 %.  Intake/Output last 3 Shifts:  I/O last 3 completed shifts:  In: 3060 (36.7 mL/kg) [P.O.:2460; IV Piggyback:600]  Out: 1500 (18 mL/kg) [Urine:1500 (0.5 mL/kg/hr)]  Dosing Weight: 83.3 kg     Relevant Results  Scheduled medications  budesonide, 0.5 mg, nebulization, BID  busPIRone, 15 mg, oral, BID  DULoxetine, 20 mg, oral, Nightly  enoxaparin, 40 mg, subcutaneous, Daily  gabapentin, 800 mg, oral, TID  ipratropium-albuteroL, 3 mL, nebulization, 4x daily  lidocaine, 1 patch, transdermal, Daily  meropenem, 1 g, intravenous, q8h  methylPREDNISolone sodium succinate (PF), 40 mg, intravenous, q8h  nicotine, 1 patch, transdermal, q24h  pantoprazole, 40 mg, oral, BID  thiamine, 100 mg, oral, Daily  zolpidem, 10 mg, oral, Nightly      Continuous medications     PRN medications  PRN medications: acetaminophen **OR** acetaminophen **OR** acetaminophen, buprenorphine, cloNIDine, dicyclomine, hydrOXYzine pamoate, ibuprofen, LORazepam, methocarbamol, ondansetron **OR** ondansetron, oxygen, polyethylene glycol, traZODone  Results for orders placed or performed during the hospital encounter of 12/13/23 (from the past 24 hour(s))   CBC   Result Value Ref Range    WBC 13.5 (H) 4.4 - 11.3 x10*3/uL    nRBC 0.0 0.0 - 0.0 /100 WBCs    RBC 4.13 (L) 4.50 - 5.90 x10*6/uL    Hemoglobin 11.9 (L) 13.5 - 17.5 g/dL    Hematocrit 39.0 (L) 41.0 - 52.0 %    MCV 94 80 - 100 fL    MCH 28.8 26.0 - 34.0 pg    MCHC 30.5 (L) 32.0 - 36.0 g/dL    RDW 12.5 11.5 - 14.5 %    Platelets 340 150 - 450 x10*3/uL   Basic Metabolic Panel   Result Value Ref Range    Glucose 152 (H) 65 - 99 mg/dL    Sodium 140 133 - 145 mmol/L    Potassium 4.3 3.4 - 5.1 mmol/L    Chloride 101 97 - 107 mmol/L    Bicarbonate 29 24 - 31 mmol/L    Urea Nitrogen 20 8 - 25 mg/dL    Creatinine 0.80 0.40 - 1.60 mg/dL    eGFR >90 >60 mL/min/1.73m*2    Calcium 9.4 8.5 - 10.4 mg/dL    Anion Gap 10 <=19 mmol/L     XR chest  1 view    Result Date: 12/15/2023  Interpreted By:  John Quarles, STUDY: XR CHEST 1 VIEW; 12/15/2023 9:24 am   INDICATION: Signs/Symptoms:hypoxic respiratory estela;ure.   COMPARISON: 12/14/2023   ACCESSION NUMBER(S): [id number]   ORDERING CLINICIAN: ALFONSO SANDERS   TECHNIQUE: 1 view of the chest was performed.   FINDINGS: The lungs are adequately inflated. Marked interval improvement. Near complete resolution of bibasilar airspace opacities. The pulmonary vasculature and interstitium also appears improved although in part may be due to improved inspiration. No significant consolidation. No pleural effusion. No pneumothorax.  The cardiomediastinal silhouette is within normal limits.       Marked interval improvement. Near complete resolution of bibasilar airspace opacities.   Signed by: John Quarles 12/15/2023 1:27 PM Dictation workstation:   [id number]    ECG 12 Lead    Result Date: 12/14/2023  Normal sinus rhythm Normal ECG When compared with ECG of 01-JAN-2014 20:42, RSR' pattern in V1 is no longer Present    XR chest 1 view    Result Date: 12/14/2023  Interpreted By:  Ruy Feliz, STUDY: XR CHEST 1 VIEW; 12/14/2023 5:47 am   INDICATION: Signs/Symptoms:Hypoxic respiratory failure   COMPARISON: 12/13/2023   ACCESSION NUMBER(S): [id number]   ORDERING CLINICIAN: EDWARD HALL   TECHNIQUE: AP projection   FINDINGS: The cardiomediastinal silhouette is unremarkable. Moderate vascular congestion is noted. Patchy infiltrate can be seen in the lung bases. No large pleural effusion is seen.   Cervical fixation plate is noted.       1. Moderate vascular congestion. 2. Bibasilar patchy infiltrate.       MACRO: None   Signed by: Ruy Feliz 12/14/2023 10:42 AM Dictation workstation:   [id number]    CT head wo IV contrast    Result Date: 12/13/2023  Interpreted By:  Quique Morin, STUDY: CT HEAD WO IV CONTRAST; 12/13/2023 8:25 pm   INDICATION: Signs/Symptoms:altered MS.   COMPARISON: 31 December 2014    ACCESSION NUMBER(S): [id number]   ORDERING CLINICIAN: HANSA WINTER   TECHNIQUE: CT was performed with one or more of the following dose reduction techniques: automated exposure control, adjustment of the mA and/or kV according to patient size, or use of iterative reconstruction technique.   PROCEDURE: 3.0 mm axial images were obtained through the brain, to include the posterior fossa without intravenous contrast enhancement.   FINDINGS: The cisterns, sulci and CSF spaces are normal in appearance. No intra or extra-axial mass or abnormal blood products are demonstrated. Brain stem, and cerebellar hemispheres are unremarkable. There is no evidence of  mass effect, or hemorrhage.   The calvarium, orbits and mastoids are unremarkable. Moderate nodular right greater than left ethmoid mucosal thickening demonstrated. Sphenoid and frontal sinuses are clear. Mastoids are unremarkable.   No posttraumatic abnormality of the scalp or calvarium.       No Acute Intracranial Findings. No posttraumatic abnormality demonstrated.     This report has been produced using speech recognition. This exam is available in DICOM format to non-affiliated healthcare facilities on a secure media free searchable basis with prior patient authorization. The patient exposure is reported to a radiation dose index registry. All CT examinations are performed with one or more of the following dose reduction techniques: Automated Exposure Control, Adjustment of mA and/or KV according to patient size, or use of iterative reconstruction techniques.   Signed by: Quique Morin 12/13/2023 8:36 PM Dictation workstation:   [id number]    XR chest 1 view    Result Date: 12/13/2023  Interpreted By:  Quique Morin, STUDY: XR CHEST 1 VIEW; 12/13/2023 8:05 pm   INDICATION: Signs/Symptoms:dyspnea.   COMPARISON: 9 October 2007   ACCESSION NUMBER(S): [id number]   ORDERING CLINICIAN: HANSA WINTER   TECHNIQUE: 2 AP semi-erect frontal views of the chest were  performed.   FINDINGS: Diffuse increased interstitial edema seen with central vascular congestion without pleural effusions consistent with mild to moderate fluid overload. No focal airspace disease.  The heart and mediastinal structures are within normal limits. Enlarged main pulmonary arteries may be due to pulmonary hypertension. There is mild central vascular congestion.       Mild-to-moderate interstitial edema without pleural effusions with mild central vascular congestion and enlarged main pulmonary arteries. No cardiomegaly. Atypical infiltrate not completely excluded   Signed by: Quique Morin 12/13/2023 8:08 PM Dictation workstation:   [id number]     Assessment/Plan   Principal Problem:    Acute pulmonary edema (CMS/HCC)  Aspiration pneumonia    Plan  Continue weaning oxygen to 92%, patient on 4 L nasal cannula   Continue IV antibiotics  Continue ICS and breathing treatments  Pulmonary hygiene/incentive spirometry  Continue IV steroids, start to decrease tomorrow  Prophylaxis  Get a sputum culture, if patient is able to bring up sputum  Collaborating with Dr. Ellsworth  Discharge planning-discharge home when medically stable      I spent 15 minutes in the professional and overall care of this patient.      Cortez Garner, APRN-CNP  [facility]

## 2023-12-16 NOTE — CARE PLAN
Problem: Respiratory  Goal: Verbalize decreased shortness of breath this shift  Outcome: Progressing
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
## 2023-12-16 NOTE — CARE PLAN
Problem: Pain - Adult  Goal: Verbalizes/displays adequate comfort level or baseline comfort level  Outcome: Progressing     Problem: Safety - Adult  Goal: Free from fall injury  Outcome: Progressing     Problem: Discharge Planning  Goal: Discharge to home or other facility with appropriate resources  Outcome: Progressing     Problem: Chronic Conditions and Co-morbidities  Goal: Patient's chronic conditions and co-morbidity symptoms are monitored and maintained or improved  Outcome: Progressing     Problem: Skin  Goal: Decreased wound size/increased tissue granulation at next dressing change  Outcome: Progressing  Goal: Participates in plan/prevention/treatment measures  Outcome: Progressing  Goal: Prevent/manage excess moisture  Outcome: Progressing  Goal: Prevent/minimize sheer/friction injuries  Outcome: Progressing  Goal: Promote/optimize nutrition  Outcome: Progressing  Goal: Promote skin healing  Outcome: Progressing   The patient's goals for the shift include pain control    The clinical goals for the shift include monitor withdrawal symptoms

## 2023-12-17 VITALS
TEMPERATURE: 97.7 F | HEIGHT: 72 IN | SYSTOLIC BLOOD PRESSURE: 131 MMHG | RESPIRATION RATE: 15 BRPM | DIASTOLIC BLOOD PRESSURE: 108 MMHG | WEIGHT: 169.97 LBS | OXYGEN SATURATION: 95 % | HEART RATE: 77 BPM | BODY MASS INDEX: 23.02 KG/M2

## 2023-12-17 LAB
ATRIAL RATE: 86 BPM
ERYTHROCYTE [DISTWIDTH] IN BLOOD BY AUTOMATED COUNT: 12.5 % (ref 11.5–14.5)
HCT VFR BLD AUTO: 41.1 % (ref 41–52)
HGB BLD-MCNC: 12.5 G/DL (ref 13.5–17.5)
MCH RBC QN AUTO: 29.4 PG (ref 26–34)
MCHC RBC AUTO-ENTMCNC: 30.4 G/DL (ref 32–36)
MCV RBC AUTO: 97 FL (ref 80–100)
NRBC BLD-RTO: 0 /100 WBCS (ref 0–0)
P AXIS: 12 DEGREES
P OFFSET: 200 MS
P ONSET: 144 MS
PLATELET # BLD AUTO: 294 X10*3/UL (ref 150–450)
PR INTERVAL: 164 MS
Q ONSET: 226 MS
QRS COUNT: 14 BEATS
QRS DURATION: 92 MS
QT INTERVAL: 366 MS
QTC CALCULATION(BAZETT): 437 MS
QTC FREDERICIA: 412 MS
R AXIS: -3 DEGREES
RBC # BLD AUTO: 4.25 X10*6/UL (ref 4.5–5.9)
T AXIS: 49 DEGREES
T OFFSET: 409 MS
VENTRICULAR RATE: 86 BPM
WBC # BLD AUTO: 16.8 X10*3/UL (ref 4.4–11.3)

## 2023-12-17 PROCEDURE — 36415 COLL VENOUS BLD VENIPUNCTURE: CPT | Performed by: INTERNAL MEDICINE

## 2023-12-17 PROCEDURE — 2500000004 HC RX 250 GENERAL PHARMACY W/ HCPCS (ALT 636 FOR OP/ED): Performed by: INTERNAL MEDICINE

## 2023-12-17 PROCEDURE — 85027 COMPLETE CBC AUTOMATED: CPT | Performed by: INTERNAL MEDICINE

## 2023-12-17 RX ADMIN — Medication 1 G: at 03:44

## 2023-12-17 RX ADMIN — METHYLPREDNISOLONE SODIUM SUCCINATE 40 MG: 40 INJECTION, POWDER, FOR SOLUTION INTRAMUSCULAR; INTRAVENOUS at 00:28

## 2023-12-17 NOTE — NURSING NOTE
Patient agitated, upset with being poked and prodded for labs, IV antibiotic being hooked up, threatening to leave, refusing to put back in oxygen, O2 was 95-96 when spot checked on 1L. Calmed down into getting back into bed, but refusing oxygen still. Dr. Babin notified.

## 2023-12-17 NOTE — CARE PLAN
Problem: Pain - Adult  Goal: Verbalizes/displays adequate comfort level or baseline comfort level  Outcome: Progressing     Problem: Safety - Adult  Goal: Free from fall injury  Outcome: Progressing     Problem: Discharge Planning  Goal: Discharge to home or other facility with appropriate resources  Outcome: Progressing     Problem: Chronic Conditions and Co-morbidities  Goal: Patient's chronic conditions and co-morbidity symptoms are monitored and maintained or improved  Outcome: Progressing     Problem: Skin  Goal: Decreased wound size/increased tissue granulation at next dressing change  Outcome: Progressing  Goal: Participates in plan/prevention/treatment measures  Outcome: Progressing  Goal: Prevent/manage excess moisture  Outcome: Progressing  Goal: Prevent/minimize sheer/friction injuries  Outcome: Progressing  Goal: Promote/optimize nutrition  Outcome: Progressing  Goal: Promote skin healing  Outcome: Progressing   The patient's goals for the shift include No signs of respiraoty distress    The clinical goals for the shift include safety

## 2023-12-17 NOTE — NURSING NOTE
"Pt in hallway stating \"I am leaving right now.\" With belongings in hand.  Attempted to ask pt if he was willing to wait for Dr. Turner to speak with him and that it would only be a few minutes. Dr. Hernandez paged at [phone]. Pt refused to wait for physician to come to bedside. IV removed on patient and patient walked out to elevator. Awaiting Dr. Clay to call   "

## 2023-12-17 NOTE — PROGRESS NOTES
Physical Therapy                 Therapy Communication Note    Patient Name: Abad Moore Jr.  MRN: [id number]  Today's Date: 12/17/2023     Discipline: Physical Therapy    Missed Visit Reason: Missed Visit Reason: Other (Comment) (RN reports patient has left hospital;  time 08:30.)    Missed Time: Cancel
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
## 2023-12-18 LAB — BACTERIA BLD CULT: NORMAL

## 2023-12-19 LAB
BACTERIA BLD AEROBE CULT: ABNORMAL
BACTERIA BLD AEROBE CULT: ABNORMAL
BACTERIA BLD CULT: ABNORMAL
BACTERIA BLD CULT: ABNORMAL
GRAM STN SPEC: ABNORMAL

## 2023-12-25 NOTE — DOCUMENTATION CLARIFICATION NOTE
"    PATIENT:               SHIREEN UGARTE  ACCT #:                  [id number]  MRN:                       [id number]  :                       1980  ADMIT DATE:       2023 7:24 PM  DISCH DATE:        2023 1:00 PM  RESPONDING PROVIDER #:        [id number]          PROVIDER RESPONSE TEXT:    Toxic Encephalopathy 2/2 Opiate abuse    CDI QUERY TEXT:    [facility]_Encephalopathy Type        Instruction:    Based on your assessment of the patient and the clinical information, please provide the requested documentation by clicking on the appropriate radio button and enter any additional information if prompted.    Question: Please further clarify the type of Encephalopathy as    When answering this query, please exercise your independent professional judgment. The fact that a question is being asked, does not imply that any particular answer is desired or expected.    The patient's clinical indicators include:  Clinical Information: 43 y.o. male presenting with shortness of breath.    Clinical Indicators:    : HP: \" At the time of evaluation, he was on high flow nasal oxygen, obtunded but arousable, provided very limited history.\" \"Reportedly vomited.  He became short of breath and required oxygen. A chest x-ray in the emergency department showed possible pulmonary edema.  He was placed on high flow nasal oxygen at 30 L/min, FiO2 of 100%.  At the time of this evaluation in the ICU, he was on 40 L/min and FiO2 of 70 percent\"    : ABG: pCO2 48, HCO3 31., pH 7.42, pO2 155 POCT base excess 5.5    : Dr. Da Silva: \"Initial chest x-ray showed interstitial edema and vascular congestion consistent with acute congestive heart failure.  Today's chest x-ray not yet interpreted by the radiologist showed some basilar consolidation versus atelectasis.\"  \"Impression:    .  Acute hypoxic and hypercapnic respiratory failure  .  Noncardiogenic pulmonary edema, likely related to narcotic use  .  Possible aspiration " "pneumonia  .  Multi substance drug abuse\"      Treatment: ICU admit, HFNC 30L/ min FiO2 100 percent, HFNC 40 L/min and FiO2 of 70 percent    Risk Factors: Drug use and abuse  Options provided:  -- Anoxic Encephalopathy  -- Toxic Encephalopathy 2/2 Opiate abuse  -- Other - I will add my own diagnosis  -- Refer to Clinical Documentation Reviewer    Query created by: Mando Del Angel on 12/14/2023 11:31 AM      Electronically signed by:  GUTIERREZ MAIN MD 12/25/2023 11:39 AM          "